# Patient Record
Sex: MALE | Race: WHITE | NOT HISPANIC OR LATINO
[De-identification: names, ages, dates, MRNs, and addresses within clinical notes are randomized per-mention and may not be internally consistent; named-entity substitution may affect disease eponyms.]

---

## 2018-03-01 ENCOUNTER — APPOINTMENT (OUTPATIENT)
Dept: CARDIOLOGY | Facility: CLINIC | Age: 53
End: 2018-03-01
Payer: MEDICARE

## 2018-03-01 ENCOUNTER — NON-APPOINTMENT (OUTPATIENT)
Age: 53
End: 2018-03-01

## 2018-03-01 VITALS
DIASTOLIC BLOOD PRESSURE: 94 MMHG | HEART RATE: 88 BPM | OXYGEN SATURATION: 94 % | SYSTOLIC BLOOD PRESSURE: 162 MMHG | HEIGHT: 70 IN | WEIGHT: 242 LBS | RESPIRATION RATE: 17 BRPM | BODY MASS INDEX: 34.65 KG/M2

## 2018-03-01 VITALS — SYSTOLIC BLOOD PRESSURE: 170 MMHG | DIASTOLIC BLOOD PRESSURE: 95 MMHG

## 2018-03-01 DIAGNOSIS — R94.31 ABNORMAL ELECTROCARDIOGRAM [ECG] [EKG]: ICD-10-CM

## 2018-03-01 DIAGNOSIS — Z87.891 PERSONAL HISTORY OF NICOTINE DEPENDENCE: ICD-10-CM

## 2018-03-01 DIAGNOSIS — Z82.49 FAMILY HISTORY OF ISCHEMIC HEART DISEASE AND OTHER DISEASES OF THE CIRCULATORY SYSTEM: ICD-10-CM

## 2018-03-01 DIAGNOSIS — Z87.898 PERSONAL HISTORY OF OTHER SPECIFIED CONDITIONS: ICD-10-CM

## 2018-03-01 PROCEDURE — 93000 ELECTROCARDIOGRAM COMPLETE: CPT

## 2018-03-01 PROCEDURE — 99244 OFF/OP CNSLTJ NEW/EST MOD 40: CPT

## 2018-03-12 LAB
ALBUMIN SERPL ELPH-MCNC: 4.3 G/DL
ALP BLD-CCNC: 65 U/L
ALT SERPL-CCNC: 45 U/L
ANION GAP SERPL CALC-SCNC: 14 MMOL/L
AST SERPL-CCNC: 33 U/L
BILIRUB SERPL-MCNC: 0.9 MG/DL
BUN SERPL-MCNC: 20 MG/DL
CALCIUM SERPL-MCNC: 9.4 MG/DL
CHLORIDE SERPL-SCNC: 99 MMOL/L
CHOLEST SERPL-MCNC: 146 MG/DL
CHOLEST/HDLC SERPL: 5.2 RATIO
CO2 SERPL-SCNC: 24 MMOL/L
CREAT SERPL-MCNC: 0.76 MG/DL
HDLC SERPL-MCNC: 28 MG/DL
LDLC SERPL CALC-MCNC: 96 MG/DL
POTASSIUM SERPL-SCNC: 4 MMOL/L
PROT SERPL-MCNC: 7.2 G/DL
SODIUM SERPL-SCNC: 137 MMOL/L
TRIGL SERPL-MCNC: 109 MG/DL

## 2018-05-02 ENCOUNTER — APPOINTMENT (OUTPATIENT)
Dept: CARDIOLOGY | Facility: CLINIC | Age: 53
End: 2018-05-02
Payer: MEDICARE

## 2018-05-02 PROCEDURE — 93306 TTE W/DOPPLER COMPLETE: CPT

## 2018-05-02 PROCEDURE — 93015 CV STRESS TEST SUPVJ I&R: CPT

## 2018-05-17 ENCOUNTER — APPOINTMENT (OUTPATIENT)
Dept: CARDIOLOGY | Facility: CLINIC | Age: 53
End: 2018-05-17
Payer: MEDICARE

## 2018-05-17 PROCEDURE — A9500: CPT

## 2018-05-17 PROCEDURE — 78452 HT MUSCLE IMAGE SPECT MULT: CPT

## 2018-05-17 PROCEDURE — 93015 CV STRESS TEST SUPVJ I&R: CPT

## 2018-05-30 ENCOUNTER — RX RENEWAL (OUTPATIENT)
Age: 53
End: 2018-05-30

## 2018-06-25 ENCOUNTER — RX RENEWAL (OUTPATIENT)
Age: 53
End: 2018-06-25

## 2018-07-18 ENCOUNTER — RX RENEWAL (OUTPATIENT)
Age: 53
End: 2018-07-18

## 2018-08-09 ENCOUNTER — RX RENEWAL (OUTPATIENT)
Age: 53
End: 2018-08-09

## 2018-08-30 ENCOUNTER — RX RENEWAL (OUTPATIENT)
Age: 53
End: 2018-08-30

## 2018-11-13 ENCOUNTER — APPOINTMENT (OUTPATIENT)
Dept: INTERNAL MEDICINE | Facility: CLINIC | Age: 53
End: 2018-11-13
Payer: MEDICARE

## 2018-11-13 VITALS
HEART RATE: 88 BPM | RESPIRATION RATE: 16 BRPM | BODY MASS INDEX: 33.64 KG/M2 | HEIGHT: 70 IN | DIASTOLIC BLOOD PRESSURE: 80 MMHG | WEIGHT: 235 LBS | SYSTOLIC BLOOD PRESSURE: 120 MMHG

## 2018-11-13 DIAGNOSIS — Z86.39 PERSONAL HISTORY OF OTHER ENDOCRINE, NUTRITIONAL AND METABOLIC DISEASE: ICD-10-CM

## 2018-11-13 DIAGNOSIS — B36.9 SUPERFICIAL MYCOSIS, UNSPECIFIED: ICD-10-CM

## 2018-11-13 PROCEDURE — 99204 OFFICE O/P NEW MOD 45 MIN: CPT | Mod: 25

## 2018-11-13 NOTE — HISTORY OF PRESENT ILLNESS
[FreeTextEntry1] : New patient visit [de-identified] : Had cardiac workup this year normal workup\par \par hx HTN and high TG

## 2019-01-22 ENCOUNTER — RX RENEWAL (OUTPATIENT)
Age: 54
End: 2019-01-22

## 2019-02-11 ENCOUNTER — NON-APPOINTMENT (OUTPATIENT)
Age: 54
End: 2019-02-11

## 2019-02-11 ENCOUNTER — APPOINTMENT (OUTPATIENT)
Dept: CARDIOLOGY | Facility: CLINIC | Age: 54
End: 2019-02-11
Payer: MEDICARE

## 2019-02-11 VITALS
BODY MASS INDEX: 34.93 KG/M2 | SYSTOLIC BLOOD PRESSURE: 177 MMHG | HEIGHT: 70 IN | WEIGHT: 244 LBS | OXYGEN SATURATION: 93 % | HEART RATE: 55 BPM | DIASTOLIC BLOOD PRESSURE: 95 MMHG | RESPIRATION RATE: 17 BRPM

## 2019-02-11 VITALS — DIASTOLIC BLOOD PRESSURE: 80 MMHG | SYSTOLIC BLOOD PRESSURE: 160 MMHG

## 2019-02-11 DIAGNOSIS — I77.810 THORACIC AORTIC ECTASIA: ICD-10-CM

## 2019-02-11 PROCEDURE — 99215 OFFICE O/P EST HI 40 MIN: CPT

## 2019-02-11 PROCEDURE — 93000 ELECTROCARDIOGRAM COMPLETE: CPT

## 2019-02-11 NOTE — DISCUSSION/SUMMARY
[Hyperlipidemia] : hyperlipidemia [Lipids Test Panel] : a fasting lipid profile [Stress Test Treadmill] : an exercise treadmill test [Smoking] : smoking [Family History of CAD] : family history of CAD [Hypertension] : hypertension [Not Responding to Treatment] : not responding to treatment [Echocardiogram] : echocardiogram [Stress Testing] : stress testing [Medication Changes Per Orders] : as documented in orders [Exercise Regimen] : an exercise regimen [Weight Loss] : weight loss [Sodium Restriction] : sodium restriction [de-identified] : increase valsarten to 320/12.5

## 2019-02-11 NOTE — PHYSICAL EXAM
[General Appearance - Well Developed] : well developed [Normal Appearance] : normal appearance [Well Groomed] : well groomed [General Appearance - Well Nourished] : well nourished [No Deformities] : no deformities [General Appearance - In No Acute Distress] : no acute distress [Normal Conjunctiva] : the conjunctiva exhibited no abnormalities [Eyelids - No Xanthelasma] : the eyelids demonstrated no xanthelasmas [Normal Oral Mucosa] : normal oral mucosa [No Oral Pallor] : no oral pallor [No Oral Cyanosis] : no oral cyanosis [Normal Jugular Venous A Waves Present] : normal jugular venous A waves present [Normal Jugular Venous V Waves Present] : normal jugular venous V waves present [No Jugular Venous Salas A Waves] : no jugular venous salas A waves [Respiration, Rhythm And Depth] : normal respiratory rhythm and effort [Exaggerated Use Of Accessory Muscles For Inspiration] : no accessory muscle use [Auscultation Breath Sounds / Voice Sounds] : lungs were clear to auscultation bilaterally [Abdomen Soft] : soft [Abdomen Tenderness] : non-tender [Abdomen Mass (___ Cm)] : no abdominal mass palpated [Abnormal Walk] : normal gait [Gait - Sufficient For Exercise Testing] : the gait was sufficient for exercise testing [Nail Clubbing] : no clubbing of the fingernails [Cyanosis, Localized] : no localized cyanosis [Petechial Hemorrhages (___cm)] : no petechial hemorrhages [Skin Color & Pigmentation] : normal skin color and pigmentation [] : no rash [No Venous Stasis] : no venous stasis [Skin Lesions] : no skin lesions [No Skin Ulcers] : no skin ulcer [No Xanthoma] : no  xanthoma was observed [Oriented To Time, Place, And Person] : oriented to person, place, and time [Affect] : the affect was normal [Mood] : the mood was normal [No Anxiety] : not feeling anxious [Normal Rate] : normal [Normal S1] : normal S1 [Normal S2] : normal S2 [S3] : no S3 [S4] : no S4 [No Murmur] : no murmurs heard [Right Carotid Bruit] : no bruit heard over the right carotid [Left Carotid Bruit] : no bruit heard over the left carotid [Right Femoral Bruit] : no bruit heard over the right femoral artery [Left Femoral Bruit] : no bruit heard over the left femoral artery [2+] : left 2+ [No Abnormalities] : the abdominal aorta was not enlarged and no bruit was heard [No Pitting Edema] : no pitting edema present

## 2019-02-11 NOTE — REASON FOR VISIT
[Consultation] : a consultation regarding [Hyperlipidemia] : hyperlipidemia [Hypertension] : hypertension [FreeTextEntry2] : pt changing cardiologists with multiple risk factors, denies sscp or sob, remote h/o syncope 2014 with hi BP, no dizziness or syncope since

## 2019-03-20 ENCOUNTER — RX RENEWAL (OUTPATIENT)
Age: 54
End: 2019-03-20

## 2019-05-22 ENCOUNTER — RX RENEWAL (OUTPATIENT)
Age: 54
End: 2019-05-22

## 2019-06-03 ENCOUNTER — APPOINTMENT (OUTPATIENT)
Dept: CARDIOLOGY | Facility: CLINIC | Age: 54
End: 2019-06-03
Payer: MEDICARE

## 2019-06-03 ENCOUNTER — NON-APPOINTMENT (OUTPATIENT)
Age: 54
End: 2019-06-03

## 2019-06-03 VITALS
SYSTOLIC BLOOD PRESSURE: 177 MMHG | BODY MASS INDEX: 35.07 KG/M2 | RESPIRATION RATE: 17 BRPM | DIASTOLIC BLOOD PRESSURE: 74 MMHG | OXYGEN SATURATION: 94 % | HEIGHT: 70 IN | HEART RATE: 76 BPM | WEIGHT: 245 LBS

## 2019-06-03 VITALS — DIASTOLIC BLOOD PRESSURE: 84 MMHG | SYSTOLIC BLOOD PRESSURE: 148 MMHG

## 2019-06-03 PROCEDURE — 99214 OFFICE O/P EST MOD 30 MIN: CPT

## 2019-06-03 PROCEDURE — 93306 TTE W/DOPPLER COMPLETE: CPT

## 2019-06-03 PROCEDURE — 93000 ELECTROCARDIOGRAM COMPLETE: CPT

## 2019-06-05 NOTE — REASON FOR VISIT
[Hyperlipidemia] : hyperlipidemia [Hypertension] : hypertension [Follow-Up - Clinic] : a clinic follow-up of [FreeTextEntry2] : pt changing cardiologists with multiple risk factors, denies sscp or sob, remote h/o syncope 2014 with hi BP, no dizziness or syncope since

## 2019-06-05 NOTE — DISCUSSION/SUMMARY
[Hyperlipidemia] : hyperlipidemia [Lipids Test Panel] : a fasting lipid profile [Smoking] : smoking [Hypertension] : hypertension [Family History of CAD] : family history of CAD [Exercise Regimen] : an exercise regimen [Medication Changes Per Orders] : as documented in orders [Sodium Restriction] : sodium restriction [Weight Loss] : weight loss [Responding to Treatment] : responding to treatment [Electron Beam CT] : electron beam CT [de-identified] : dilated aorta

## 2019-06-05 NOTE — PHYSICAL EXAM
[General Appearance - Well Developed] : well developed [Normal Appearance] : normal appearance [Well Groomed] : well groomed [General Appearance - Well Nourished] : well nourished [No Deformities] : no deformities [General Appearance - In No Acute Distress] : no acute distress [Eyelids - No Xanthelasma] : the eyelids demonstrated no xanthelasmas [Normal Conjunctiva] : the conjunctiva exhibited no abnormalities [Normal Oral Mucosa] : normal oral mucosa [No Oral Pallor] : no oral pallor [No Oral Cyanosis] : no oral cyanosis [Normal Jugular Venous A Waves Present] : normal jugular venous A waves present [Normal Jugular Venous V Waves Present] : normal jugular venous V waves present [No Jugular Venous Salas A Waves] : no jugular venous salas A waves [Exaggerated Use Of Accessory Muscles For Inspiration] : no accessory muscle use [Respiration, Rhythm And Depth] : normal respiratory rhythm and effort [Auscultation Breath Sounds / Voice Sounds] : lungs were clear to auscultation bilaterally [Abdomen Soft] : soft [Abdomen Tenderness] : non-tender [Abdomen Mass (___ Cm)] : no abdominal mass palpated [Abnormal Walk] : normal gait [Gait - Sufficient For Exercise Testing] : the gait was sufficient for exercise testing [Nail Clubbing] : no clubbing of the fingernails [Cyanosis, Localized] : no localized cyanosis [Petechial Hemorrhages (___cm)] : no petechial hemorrhages [Skin Color & Pigmentation] : normal skin color and pigmentation [] : no rash [No Venous Stasis] : no venous stasis [Skin Lesions] : no skin lesions [No Skin Ulcers] : no skin ulcer [No Xanthoma] : no  xanthoma was observed [Oriented To Time, Place, And Person] : oriented to person, place, and time [Mood] : the mood was normal [No Anxiety] : not feeling anxious [Affect] : the affect was normal [Normal S1] : normal S1 [Normal Rate] : normal [Normal S2] : normal S2 [No Murmur] : no murmurs heard [No Abnormalities] : the abdominal aorta was not enlarged and no bruit was heard [2+] : left 2+ [No Pitting Edema] : no pitting edema present [S3] : no S3 [S4] : no S4 [Right Carotid Bruit] : no bruit heard over the right carotid [Left Carotid Bruit] : no bruit heard over the left carotid [Left Femoral Bruit] : no bruit heard over the left femoral artery [Right Femoral Bruit] : no bruit heard over the right femoral artery

## 2019-06-12 LAB
ALBUMIN SERPL ELPH-MCNC: 4.3 G/DL
ALP BLD-CCNC: 63 U/L
ALT SERPL-CCNC: 61 U/L
ANION GAP SERPL CALC-SCNC: 11 MMOL/L
AST SERPL-CCNC: 42 U/L
BILIRUB SERPL-MCNC: 0.7 MG/DL
BUN SERPL-MCNC: 21 MG/DL
CALCIUM SERPL-MCNC: 9.2 MG/DL
CHLORIDE SERPL-SCNC: 100 MMOL/L
CHOLEST SERPL-MCNC: 138 MG/DL
CHOLEST/HDLC SERPL: 4.8 RATIO
CO2 SERPL-SCNC: 31 MMOL/L
CREAT SERPL-MCNC: 0.88 MG/DL
GLUCOSE SERPL-MCNC: 117 MG/DL
HDLC SERPL-MCNC: 29 MG/DL
LDLC SERPL CALC-MCNC: 64 MG/DL
POTASSIUM SERPL-SCNC: 4.7 MMOL/L
PROT SERPL-MCNC: 6.8 G/DL
SODIUM SERPL-SCNC: 142 MMOL/L
TRIGL SERPL-MCNC: 225 MG/DL

## 2019-06-26 ENCOUNTER — OUTPATIENT (OUTPATIENT)
Dept: OUTPATIENT SERVICES | Facility: HOSPITAL | Age: 54
LOS: 1 days | End: 2019-06-26
Payer: MEDICARE

## 2019-06-26 ENCOUNTER — APPOINTMENT (OUTPATIENT)
Dept: CT IMAGING | Facility: HOSPITAL | Age: 54
End: 2019-06-26

## 2019-06-26 DIAGNOSIS — R07.9 CHEST PAIN, UNSPECIFIED: ICD-10-CM

## 2019-06-26 PROCEDURE — 71275 CT ANGIOGRAPHY CHEST: CPT

## 2019-06-26 PROCEDURE — 71275 CT ANGIOGRAPHY CHEST: CPT | Mod: 26

## 2019-09-19 ENCOUNTER — APPOINTMENT (OUTPATIENT)
Dept: CARDIOLOGY | Facility: CLINIC | Age: 54
End: 2019-09-19
Payer: MEDICARE

## 2019-09-30 ENCOUNTER — NON-APPOINTMENT (OUTPATIENT)
Age: 54
End: 2019-09-30

## 2019-09-30 PROCEDURE — 93224 XTRNL ECG REC UP TO 48 HRS: CPT

## 2019-10-03 ENCOUNTER — APPOINTMENT (OUTPATIENT)
Dept: CARDIOLOGY | Facility: CLINIC | Age: 54
End: 2019-10-03
Payer: MEDICARE

## 2019-10-03 VITALS
DIASTOLIC BLOOD PRESSURE: 100 MMHG | WEIGHT: 250 LBS | OXYGEN SATURATION: 98 % | RESPIRATION RATE: 17 BRPM | SYSTOLIC BLOOD PRESSURE: 180 MMHG | BODY MASS INDEX: 35.79 KG/M2 | HEIGHT: 70 IN | HEART RATE: 86 BPM

## 2019-10-03 PROCEDURE — 93000 ELECTROCARDIOGRAM COMPLETE: CPT

## 2019-10-03 PROCEDURE — 99215 OFFICE O/P EST HI 40 MIN: CPT

## 2019-10-03 NOTE — PHYSICAL EXAM
[General Appearance - Well Developed] : well developed [Normal Appearance] : normal appearance [Well Groomed] : well groomed [General Appearance - Well Nourished] : well nourished [No Deformities] : no deformities [General Appearance - In No Acute Distress] : no acute distress [Normal Conjunctiva] : the conjunctiva exhibited no abnormalities [Eyelids - No Xanthelasma] : the eyelids demonstrated no xanthelasmas [Normal Oral Mucosa] : normal oral mucosa [No Oral Pallor] : no oral pallor [Normal Jugular Venous A Waves Present] : normal jugular venous A waves present [No Oral Cyanosis] : no oral cyanosis [Normal Jugular Venous V Waves Present] : normal jugular venous V waves present [No Jugular Venous Salas A Waves] : no jugular venous salas A waves [Respiration, Rhythm And Depth] : normal respiratory rhythm and effort [Exaggerated Use Of Accessory Muscles For Inspiration] : no accessory muscle use [Auscultation Breath Sounds / Voice Sounds] : lungs were clear to auscultation bilaterally [Abdomen Soft] : soft [Abdomen Tenderness] : non-tender [Abdomen Mass (___ Cm)] : no abdominal mass palpated [Abnormal Walk] : normal gait [Gait - Sufficient For Exercise Testing] : the gait was sufficient for exercise testing [Nail Clubbing] : no clubbing of the fingernails [Petechial Hemorrhages (___cm)] : no petechial hemorrhages [Cyanosis, Localized] : no localized cyanosis [Skin Color & Pigmentation] : normal skin color and pigmentation [] : no rash [No Venous Stasis] : no venous stasis [No Skin Ulcers] : no skin ulcer [Skin Lesions] : no skin lesions [No Xanthoma] : no  xanthoma was observed [Oriented To Time, Place, And Person] : oriented to person, place, and time [Affect] : the affect was normal [Mood] : the mood was normal [No Anxiety] : not feeling anxious [Normal Rate] : normal [Normal S1] : normal S1 [Normal S2] : normal S2 [No Murmur] : no murmurs heard [2+] : left 2+ [No Abnormalities] : the abdominal aorta was not enlarged and no bruit was heard [No Pitting Edema] : no pitting edema present [S3] : no S3 [S4] : no S4 [Right Carotid Bruit] : no bruit heard over the right carotid [Left Carotid Bruit] : no bruit heard over the left carotid [Right Femoral Bruit] : no bruit heard over the right femoral artery [Left Femoral Bruit] : no bruit heard over the left femoral artery

## 2019-10-03 NOTE — REASON FOR VISIT
[Follow-Up - Clinic] : a clinic follow-up of [Hyperlipidemia] : hyperlipidemia [Hypertension] : hypertension [FreeTextEntry2] : pt s/p changed cardiologists with multiple risk factors, denies sscp or sob, remote h/o syncope 2014 with hi BP, no dizziness or syncope since

## 2019-10-03 NOTE — DISCUSSION/SUMMARY
[Hyperlipidemia] : hyperlipidemia [Lipids Test Panel] : a fasting lipid profile [Smoking] : smoking [Family History of CAD] : family history of CAD [Hypertension] : hypertension [Exercise Regimen] : an exercise regimen [Weight Loss] : weight loss [Sodium Restriction] : sodium restriction [PVCs] : ectopic ventricular beats [Not Responding to Treatment] : not responding to treatment [Medication Changes Per Orders] : as documented in orders [de-identified] : kailash lane with dr lacey [de-identified] : add norvasc 5 mg

## 2019-10-14 RX ORDER — AMLODIPINE BESYLATE 5 MG/1
5 TABLET ORAL DAILY
Qty: 30 | Refills: 3 | Status: DISCONTINUED | COMMUNITY
Start: 2019-10-03 | End: 2019-10-14

## 2019-10-22 ENCOUNTER — APPOINTMENT (OUTPATIENT)
Dept: CARDIOLOGY | Facility: CLINIC | Age: 54
End: 2019-10-22
Payer: MEDICARE

## 2019-10-22 PROCEDURE — 93975 VASCULAR STUDY: CPT

## 2019-12-11 ENCOUNTER — APPOINTMENT (OUTPATIENT)
Dept: CARDIOLOGY | Facility: CLINIC | Age: 54
End: 2019-12-11

## 2020-01-14 ENCOUNTER — RX RENEWAL (OUTPATIENT)
Age: 55
End: 2020-01-14

## 2020-02-03 ENCOUNTER — APPOINTMENT (OUTPATIENT)
Dept: INTERNAL MEDICINE | Facility: CLINIC | Age: 55
End: 2020-02-03
Payer: MEDICARE

## 2020-02-03 VITALS
SYSTOLIC BLOOD PRESSURE: 150 MMHG | WEIGHT: 238 LBS | BODY MASS INDEX: 34.07 KG/M2 | HEART RATE: 86 BPM | OXYGEN SATURATION: 96 % | RESPIRATION RATE: 17 BRPM | DIASTOLIC BLOOD PRESSURE: 80 MMHG | TEMPERATURE: 97.9 F | HEIGHT: 70 IN

## 2020-02-03 DIAGNOSIS — R21 RASH AND OTHER NONSPECIFIC SKIN ERUPTION: ICD-10-CM

## 2020-02-03 DIAGNOSIS — Z23 ENCOUNTER FOR IMMUNIZATION: ICD-10-CM

## 2020-02-03 DIAGNOSIS — M25.561 PAIN IN RIGHT KNEE: ICD-10-CM

## 2020-02-03 DIAGNOSIS — B35.1 TINEA UNGUIUM: ICD-10-CM

## 2020-02-03 DIAGNOSIS — Z00.00 ENCOUNTER FOR GENERAL ADULT MEDICAL EXAMINATION W/OUT ABNORMAL FINDINGS: ICD-10-CM

## 2020-02-03 PROCEDURE — G0439: CPT

## 2020-02-03 PROCEDURE — 90686 IIV4 VACC NO PRSV 0.5 ML IM: CPT

## 2020-02-03 PROCEDURE — G0008: CPT

## 2020-02-03 PROCEDURE — G0444 DEPRESSION SCREEN ANNUAL: CPT | Mod: 59

## 2020-02-03 NOTE — HISTORY OF PRESENT ILLNESS
How Severe Is Your Skin Lesion?: mild Has Your Skin Lesion Been Treated?: not been treated Is This A New Presentation, Or A Follow-Up?: Skin Lesion [FreeTextEntry1] : Here for physical [de-identified] : Right knee\par -pain, "blows up" often\par -weekly\par \par rash\par -back rash, itchy\par -2 mos ago\par -on and off\par Toenail fungus\par Cough chronic\par -non-productive\par -smokers cigars daily

## 2020-02-03 NOTE — HEALTH RISK ASSESSMENT
[Good] : ~his/her~ current health as good [] : Yes [Yes] : Yes [2 - 3 times a week (3 pts)] : 2 - 3  times a week (3 points) [Never (0 pts)] : Never (0 points) [1 or 2 (0 pts)] : 1 or 2 (0 points) [No falls in past year] : Patient reported no falls in the past year [No] : In the past 12 months have you used drugs other than those required for medical reasons? No [0] : 2) Feeling down, depressed, or hopeless: Not at all (0) [Patient reported colonoscopy was normal] : Patient reported colonoscopy was normal [HIV test declined] : HIV test declined [Hepatitis C test declined] : Hepatitis C test declined [Employed] : employed [High School] : high school [With Family] : lives with family [Single] : single [Feels Safe at Home] : Feels safe at home [Fully functional (bathing, dressing, toileting, transferring, walking, feeding)] : Fully functional (bathing, dressing, toileting, transferring, walking, feeding) [Fully functional (using the telephone, shopping, preparing meals, housekeeping, doing laundry, using] : Fully functional and needs no help or supervision to perform IADLs (using the telephone, shopping, preparing meals, housekeeping, doing laundry, using transportation, managing medications and managing finances) [FreeTextEntry1] : above [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 [de-identified] : [employer] [Audit-CScore] : 0 [LTE1Vsdoc] : 0 [de-identified] : fair [Change in mental status noted] : No change in mental status noted [Behavior] : denies difficulty with behavior [Language] : denies difficulty with language [Reasoning] : denies difficulty with reasoning [Learning/Retaining New Information] : denies difficulty learning/retaining new information [Sexually Active] : not sexually active [Reports changes in hearing] : Reports no changes in hearing [Spatial Ability and Orientation] : denies difficulty with spatial ability and orientation [High Risk Behavior] : no high risk behavior [Reports normal functional visual acuity (ie: able to read med bottle)] : Reports poor functional visual acuity.  [Reports changes in vision] : Reports no changes in vision [Reports changes in dental health] : Reports no changes in dental health [ColonoscopyDate] : 2015 [Smoke Detector] : no smoke detector

## 2020-02-03 NOTE — PHYSICAL EXAM
[No Acute Distress] : no acute distress [Well Nourished] : well nourished [Well Developed] : well developed [Normal Sclera/Conjunctiva] : normal sclera/conjunctiva [Well-Appearing] : well-appearing [PERRL] : pupils equal round and reactive to light [EOMI] : extraocular movements intact [Normal Outer Ear/Nose] : the outer ears and nose were normal in appearance [Normal Oropharynx] : the oropharynx was normal [No JVD] : no jugular venous distention [No Lymphadenopathy] : no lymphadenopathy [Supple] : supple [Thyroid Normal, No Nodules] : the thyroid was normal and there were no nodules present [No Respiratory Distress] : no respiratory distress  [Clear to Auscultation] : lungs were clear to auscultation bilaterally [No Accessory Muscle Use] : no accessory muscle use [Normal Rate] : normal rate  [Regular Rhythm] : with a regular rhythm [Normal S1, S2] : normal S1 and S2 [No Murmur] : no murmur heard [No Carotid Bruits] : no carotid bruits [No Abdominal Bruit] : a ~M bruit was not heard ~T in the abdomen [No Edema] : there was no peripheral edema [Pedal Pulses Present] : the pedal pulses are present [No Varicosities] : no varicosities [No Palpable Aorta] : no palpable aorta [Soft] : abdomen soft [No Extremity Clubbing/Cyanosis] : no extremity clubbing/cyanosis [Non Tender] : non-tender [Non-distended] : non-distended [No Masses] : no abdominal mass palpated [No HSM] : no HSM [Normal Bowel Sounds] : normal bowel sounds [Normal Posterior Cervical Nodes] : no posterior cervical lymphadenopathy [Normal Anterior Cervical Nodes] : no anterior cervical lymphadenopathy [No CVA Tenderness] : no CVA  tenderness [No Spinal Tenderness] : no spinal tenderness [No Joint Swelling] : no joint swelling [Grossly Normal Strength/Tone] : grossly normal strength/tone [No Rash] : no rash [No Focal Deficits] : no focal deficits [Coordination Grossly Intact] : coordination grossly intact [Normal Gait] : normal gait [Deep Tendon Reflexes (DTR)] : deep tendon reflexes were 2+ and symmetric [Normal Affect] : the affect was normal [Normal Insight/Judgement] : insight and judgment were intact

## 2020-02-06 ENCOUNTER — APPOINTMENT (OUTPATIENT)
Dept: CARDIOLOGY | Facility: CLINIC | Age: 55
End: 2020-02-06

## 2020-02-07 LAB
25(OH)D3 SERPL-MCNC: 15.2 NG/ML
ALBUMIN SERPL ELPH-MCNC: 4.4 G/DL
ALP BLD-CCNC: 76 U/L
ALT SERPL-CCNC: 55 U/L
ANION GAP SERPL CALC-SCNC: 12 MMOL/L
APPEARANCE: CLEAR
AST SERPL-CCNC: 32 U/L
BACTERIA: NEGATIVE
BASOPHILS # BLD AUTO: 0.02 K/UL
BASOPHILS NFR BLD AUTO: 0.4 %
BILIRUB SERPL-MCNC: 0.7 MG/DL
BILIRUBIN URINE: NEGATIVE
BLOOD URINE: NORMAL
BUN SERPL-MCNC: 17 MG/DL
CALCIUM SERPL-MCNC: 9.2 MG/DL
CHLORIDE SERPL-SCNC: 100 MMOL/L
CHOLEST SERPL-MCNC: 156 MG/DL
CHOLEST/HDLC SERPL: 3.9 RATIO
CO2 SERPL-SCNC: 27 MMOL/L
COLOR: YELLOW
CREAT SERPL-MCNC: 0.77 MG/DL
EOSINOPHIL # BLD AUTO: 0.13 K/UL
EOSINOPHIL NFR BLD AUTO: 2.6 %
ESTIMATED AVERAGE GLUCOSE: 100 MG/DL
GLUCOSE QUALITATIVE U: NEGATIVE
GLUCOSE SERPL-MCNC: 132 MG/DL
HBA1C MFR BLD HPLC: 5.1 %
HCT VFR BLD CALC: 47.6 %
HDLC SERPL-MCNC: 40 MG/DL
HGB BLD-MCNC: 16.5 G/DL
HYALINE CASTS: 0 /LPF
IMM GRANULOCYTES NFR BLD AUTO: 0.4 %
KETONES URINE: NEGATIVE
LDLC SERPL CALC-MCNC: 97 MG/DL
LEUKOCYTE ESTERASE URINE: NEGATIVE
LYMPHOCYTES # BLD AUTO: 0.9 K/UL
LYMPHOCYTES NFR BLD AUTO: 18.2 %
MAN DIFF?: NORMAL
MCHC RBC-ENTMCNC: 33.7 PG
MCHC RBC-ENTMCNC: 34.7 GM/DL
MCV RBC AUTO: 97.3 FL
MICROSCOPIC-UA: NORMAL
MONOCYTES # BLD AUTO: 0.39 K/UL
MONOCYTES NFR BLD AUTO: 7.9 %
NEUTROPHILS # BLD AUTO: 3.49 K/UL
NEUTROPHILS NFR BLD AUTO: 70.5 %
NITRITE URINE: NEGATIVE
PH URINE: 6.5
PLATELET # BLD AUTO: 147 K/UL
POTASSIUM SERPL-SCNC: 4.6 MMOL/L
PROT SERPL-MCNC: 6.7 G/DL
PROTEIN URINE: ABNORMAL
PSA SERPL-MCNC: 0.81 NG/ML
RBC # BLD: 4.89 M/UL
RBC # FLD: 13.3 %
RED BLOOD CELLS URINE: 5 /HPF
SODIUM SERPL-SCNC: 139 MMOL/L
SPECIFIC GRAVITY URINE: 1.02
SQUAMOUS EPITHELIAL CELLS: 0 /HPF
TRIGL SERPL-MCNC: 97 MG/DL
TSH SERPL-ACNC: 0.85 UIU/ML
UROBILINOGEN URINE: NORMAL
WBC # FLD AUTO: 4.95 K/UL
WHITE BLOOD CELLS URINE: 1 /HPF

## 2020-02-12 ENCOUNTER — APPOINTMENT (OUTPATIENT)
Dept: CARDIOLOGY | Facility: CLINIC | Age: 55
End: 2020-02-12
Payer: MEDICARE

## 2020-02-12 ENCOUNTER — NON-APPOINTMENT (OUTPATIENT)
Age: 55
End: 2020-02-12

## 2020-02-12 VITALS
BODY MASS INDEX: 33.79 KG/M2 | HEART RATE: 70 BPM | SYSTOLIC BLOOD PRESSURE: 180 MMHG | OXYGEN SATURATION: 97 % | RESPIRATION RATE: 17 BRPM | WEIGHT: 236 LBS | DIASTOLIC BLOOD PRESSURE: 84 MMHG | HEIGHT: 70 IN

## 2020-02-12 PROCEDURE — 93000 ELECTROCARDIOGRAM COMPLETE: CPT

## 2020-02-12 PROCEDURE — 99213 OFFICE O/P EST LOW 20 MIN: CPT

## 2020-02-12 NOTE — HISTORY OF PRESENT ILLNESS
[FreeTextEntry1] : Referring Physician: \par \par Dear  *****:\par \par Mr. Carlyle Weller was seen in the [facility] today. For our records, please allow me to summarize the history and my findings.\par \par This pleasant 55 year old man has a cardiovascular history significant for HL and HTN. He underwent routine stress testing in May and was noted to have frequent PVCs in recovery. Holter monitor in 9/19 showed frequent PVCs at 17%. TTE (9/19) showed LVH with normal biventricular systolic function and an otherwise grossly normal heart. Mr. Weller denies any palpitations, shortness of breath, dizziness or syncope.

## 2020-02-12 NOTE — PHYSICAL EXAM
[General Appearance - Well Developed] : well developed [Normal Appearance] : normal appearance [General Appearance - Well Nourished] : well nourished [Well Groomed] : well groomed [No Deformities] : no deformities [General Appearance - In No Acute Distress] : no acute distress [Normal Conjunctiva] : the conjunctiva exhibited no abnormalities [Eyelids - No Xanthelasma] : the eyelids demonstrated no xanthelasmas [Normal Oral Mucosa] : normal oral mucosa [No Oral Pallor] : no oral pallor [No Oral Cyanosis] : no oral cyanosis [Normal Jugular Venous A Waves Present] : normal jugular venous A waves present [Normal Jugular Venous V Waves Present] : normal jugular venous V waves present [No Jugular Venous Salas A Waves] : no jugular venous salas A waves [Heart Rate And Rhythm] : heart rate and rhythm were normal [Heart Sounds] : normal S1 and S2 [Murmurs] : no murmurs present [Respiration, Rhythm And Depth] : normal respiratory rhythm and effort [Exaggerated Use Of Accessory Muscles For Inspiration] : no accessory muscle use [Auscultation Breath Sounds / Voice Sounds] : lungs were clear to auscultation bilaterally [Abdomen Soft] : soft [Abdomen Tenderness] : non-tender [Abdomen Mass (___ Cm)] : no abdominal mass palpated [Abnormal Walk] : normal gait [Gait - Sufficient For Exercise Testing] : the gait was sufficient for exercise testing [Nail Clubbing] : no clubbing of the fingernails [Cyanosis, Localized] : no localized cyanosis [Petechial Hemorrhages (___cm)] : no petechial hemorrhages [Skin Color & Pigmentation] : normal skin color and pigmentation [] : no rash [No Venous Stasis] : no venous stasis [Skin Lesions] : no skin lesions [No Skin Ulcers] : no skin ulcer [No Xanthoma] : no  xanthoma was observed [Oriented To Time, Place, And Person] : oriented to person, place, and time [Affect] : the affect was normal [Mood] : the mood was normal [No Anxiety] : not feeling anxious

## 2020-02-12 NOTE — DISCUSSION/SUMMARY
[FreeTextEntry1] : In summary, this is a 55 year old man with LVH and asymptomatic PVCs. 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 These PVCs do not typically cause issues with dyssynchrony and have little risk of progression to cardiomyopathy. His systolic BP today was 180 mmHg, which may also be driving his PVC burden. He will increase Toprol to 70mg daily and return in 3 months for repeat Holter. At this time in the absence of symptoms I recommended only conservative therapy.\par \par Mr. Weller appeared to understand the whole discussion and verbalized that all of his questions were answered to his satisfaction.\par \par Thank you for allowing me to be involved in the care of this pleasant man. Please feel free to contact me with any questions.\par \par \par \par Obed Mendoza MD\par  of Cardiology\par Electrophysiology Section\par [address] [place] [address], [address] [place] [address]\[place] [address]\par Office: [phone]\par Fax: [phone]\par

## 2020-02-13 ENCOUNTER — FORM ENCOUNTER (OUTPATIENT)
Age: 55
End: 2020-02-13

## 2020-02-14 ENCOUNTER — OUTPATIENT (OUTPATIENT)
Dept: OUTPATIENT SERVICES | Facility: HOSPITAL | Age: 55
LOS: 1 days | End: 2020-02-14
Payer: MEDICARE

## 2020-02-14 DIAGNOSIS — Z00.00 ENCOUNTER FOR GENERAL ADULT MEDICAL EXAMINATION WITHOUT ABNORMAL FINDINGS: ICD-10-CM

## 2020-02-14 PROCEDURE — 73562 X-RAY EXAM OF KNEE 3: CPT | Mod: 26,RT

## 2020-02-14 PROCEDURE — G0297: CPT

## 2020-02-14 PROCEDURE — 73562 X-RAY EXAM OF KNEE 3: CPT

## 2020-02-14 PROCEDURE — G0297: CPT | Mod: 26

## 2020-06-24 ENCOUNTER — APPOINTMENT (OUTPATIENT)
Dept: CARDIOLOGY | Facility: CLINIC | Age: 55
End: 2020-06-24
Payer: MEDICARE

## 2020-06-24 ENCOUNTER — NON-APPOINTMENT (OUTPATIENT)
Age: 55
End: 2020-06-24

## 2020-06-24 VITALS
HEART RATE: 66 BPM | DIASTOLIC BLOOD PRESSURE: 94 MMHG | RESPIRATION RATE: 17 BRPM | WEIGHT: 236 LBS | TEMPERATURE: 98 F | OXYGEN SATURATION: 96 % | BODY MASS INDEX: 33.79 KG/M2 | SYSTOLIC BLOOD PRESSURE: 157 MMHG | HEIGHT: 70 IN

## 2020-06-24 PROCEDURE — 93000 ELECTROCARDIOGRAM COMPLETE: CPT

## 2020-06-24 PROCEDURE — 99213 OFFICE O/P EST LOW 20 MIN: CPT

## 2020-06-24 NOTE — DISCUSSION/SUMMARY
[FreeTextEntry1] : In summary, this is a 55 year old man with LVH and asymptomatic PVCs. 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 These PVCs do not typically cause issues with dyssynchrony and have little risk of progression to cardiomyopathy. He will repeat Holter monitoring today for reassessment of burden.\par \par Mr. Weller appeared to understand the whole discussion and verbalized that all of his questions were answered to his satisfaction.\par \par Thank you for allowing me to be involved in the care of this pleasant man. Please feel free to contact me with any questions.\par \par \par \par Obed Mendoza MD\[facility]  of Cardiology\par Electrophysiology Section\[address], [address] [place] [address]\[facility] Office: [phone]\par Fax: [phone]\[facility]

## 2020-06-24 NOTE — PHYSICAL EXAM
[General Appearance - Well Developed] : well developed [Normal Appearance] : normal appearance [Well Groomed] : well groomed [General Appearance - Well Nourished] : well nourished [No Deformities] : no deformities [General Appearance - In No Acute Distress] : no acute distress [Normal Conjunctiva] : the conjunctiva exhibited no abnormalities [Eyelids - No Xanthelasma] : the eyelids demonstrated no xanthelasmas [Normal Oral Mucosa] : normal oral mucosa [No Oral Pallor] : no oral pallor [No Oral Cyanosis] : no oral cyanosis [Normal Jugular Venous A Waves Present] : normal jugular venous A waves present [Normal Jugular Venous V Waves Present] : normal jugular venous V waves present [No Jugular Venous Salas A Waves] : no jugular venous salas A waves [Respiration, Rhythm And Depth] : normal respiratory rhythm and effort [Auscultation Breath Sounds / Voice Sounds] : lungs were clear to auscultation bilaterally [Exaggerated Use Of Accessory Muscles For Inspiration] : no accessory muscle use [Heart Sounds] : normal S1 and S2 [Heart Rate And Rhythm] : heart rate and rhythm were normal [Murmurs] : no murmurs present [Abdomen Tenderness] : non-tender [Abdomen Soft] : soft [Abdomen Mass (___ Cm)] : no abdominal mass palpated [Gait - Sufficient For Exercise Testing] : the gait was sufficient for exercise testing [Abnormal Walk] : normal gait [Nail Clubbing] : no clubbing of the fingernails [Cyanosis, Localized] : no localized cyanosis [Petechial Hemorrhages (___cm)] : no petechial hemorrhages [Skin Color & Pigmentation] : normal skin color and pigmentation [] : no rash [No Venous Stasis] : no venous stasis [Skin Lesions] : no skin lesions [No Skin Ulcers] : no skin ulcer [No Xanthoma] : no  xanthoma was observed [Oriented To Time, Place, And Person] : oriented to person, place, and time [Affect] : the affect was normal [Mood] : the mood was normal [No Anxiety] : not feeling anxious

## 2020-06-24 NOTE — HISTORY OF PRESENT ILLNESS
[FreeTextEntry1] : Referring Physician: Uriah Bryson MD\par \par Dear Uriah:\par \par Mr. Carlyle Weller was seen in the [facility] today. For our records, please allow me to summarize the history and my findings.\par \par This pleasant 55 year old man has a cardiovascular history significant for HL and HTN. He underwent routine stress testing in May and was noted to have frequent PVCs in recovery. Holter monitor in 9/19 showed frequent PVCs at 17%. TTE (9/19) showed LVH with normal biventricular systolic function and an otherwise grossly normal heart. He noted no symptoms associated with the arrhythmia. Beta blockers were uptitrated. He returns today for repeat Holter monitoring. Mr. Weller denies any palpitations, shortness of breath, dizziness or syncope.

## 2020-06-29 ENCOUNTER — NON-APPOINTMENT (OUTPATIENT)
Age: 55
End: 2020-06-29

## 2020-07-22 PROCEDURE — 93224 XTRNL ECG REC UP TO 48 HRS: CPT

## 2020-10-20 ENCOUNTER — RX RENEWAL (OUTPATIENT)
Age: 55
End: 2020-10-20

## 2020-11-23 ENCOUNTER — RX RENEWAL (OUTPATIENT)
Age: 55
End: 2020-11-23

## 2020-12-16 ENCOUNTER — RX RENEWAL (OUTPATIENT)
Age: 55
End: 2020-12-16

## 2021-02-28 ENCOUNTER — RX RENEWAL (OUTPATIENT)
Age: 56
End: 2021-02-28

## 2021-05-14 ENCOUNTER — RX RENEWAL (OUTPATIENT)
Age: 56
End: 2021-05-14

## 2021-10-20 ENCOUNTER — RX RENEWAL (OUTPATIENT)
Age: 56
End: 2021-10-20

## 2022-01-17 ENCOUNTER — NON-APPOINTMENT (OUTPATIENT)
Age: 57
End: 2022-01-17

## 2022-01-18 ENCOUNTER — RX RENEWAL (OUTPATIENT)
Age: 57
End: 2022-01-18

## 2022-01-18 ENCOUNTER — APPOINTMENT (OUTPATIENT)
Dept: FAMILY MEDICINE | Facility: CLINIC | Age: 57
End: 2022-01-18
Payer: MEDICARE

## 2022-01-18 ENCOUNTER — RESULT REVIEW (OUTPATIENT)
Age: 57
End: 2022-01-18

## 2022-01-18 VITALS
WEIGHT: 252 LBS | SYSTOLIC BLOOD PRESSURE: 152 MMHG | TEMPERATURE: 97.9 F | OXYGEN SATURATION: 96 % | HEIGHT: 70 IN | DIASTOLIC BLOOD PRESSURE: 94 MMHG | RESPIRATION RATE: 14 BRPM | HEART RATE: 84 BPM | BODY MASS INDEX: 36.08 KG/M2

## 2022-01-18 DIAGNOSIS — M79.89 OTHER SPECIFIED SOFT TISSUE DISORDERS: ICD-10-CM

## 2022-01-18 DIAGNOSIS — R06.2 WHEEZING: ICD-10-CM

## 2022-01-18 DIAGNOSIS — M25.569 PAIN IN UNSPECIFIED KNEE: ICD-10-CM

## 2022-01-18 PROCEDURE — 99214 OFFICE O/P EST MOD 30 MIN: CPT

## 2022-01-18 RX ORDER — NYSTATIN AND TRIAMCINOLONE ACETONIDE 100000; 1 MG/G; MG/G
100000-0.1 CREAM TOPICAL 3 TIMES DAILY
Qty: 2 | Refills: 5 | Status: DISCONTINUED | COMMUNITY
Start: 2020-02-03 | End: 2022-01-18

## 2022-01-18 RX ORDER — VALSARTAN AND HYDROCHLOROTHIAZIDE 320; 12.5 MG/1; MG/1
320-12.5 TABLET, FILM COATED ORAL DAILY
Qty: 30 | Refills: 1 | Status: DISCONTINUED | COMMUNITY
Start: 2018-05-30 | End: 2022-01-18

## 2022-01-18 RX ORDER — GEMFIBROZIL 600 MG/1
600 TABLET, FILM COATED ORAL DAILY
Qty: 60 | Refills: 1 | Status: DISCONTINUED | COMMUNITY
Start: 2018-05-30 | End: 2022-01-18

## 2022-01-18 RX ORDER — NYSTATIN AND TRIAMCINOLONE ACETONIDE 100000; 1 MG/G; MG/G
100000-0.1 CREAM TOPICAL 3 TIMES DAILY
Qty: 1 | Refills: 5 | Status: DISCONTINUED | COMMUNITY
Start: 2018-11-13 | End: 2022-01-18

## 2022-01-18 RX ORDER — CLONIDINE HYDROCHLORIDE 0.1 MG/1
0.1 TABLET ORAL
Qty: 30 | Refills: 1 | Status: DISCONTINUED | COMMUNITY
Start: 2019-10-14 | End: 2022-01-18

## 2022-01-18 RX ORDER — CICLOPIROX 80 MG/ML
8 SOLUTION TOPICAL
Qty: 1 | Refills: 1 | Status: DISCONTINUED | COMMUNITY
Start: 2020-02-03 | End: 2022-01-18

## 2022-01-18 NOTE — PHYSICAL EXAM
[No Respiratory Distress] : no respiratory distress  [No Accessory Muscle Use] : no accessory muscle use [Normal] : normal rate, regular rhythm, normal S1 and S2 and no murmur heard [de-identified] : some wheezing noticed upper lung fields  [de-identified] : R leg with varicose veins as well as skin changes of chronic venous insufficiency, knee ROM intact, ankle ROM intact

## 2022-01-18 NOTE — HISTORY OF PRESENT ILLNESS
[FreeTextEntry8] : 55 yo male presenting today for R leg swelling/skin changes. Accompanied by sister. Sister states that leg swelling has progressively become worse and notices that patient is favoring left leg. Patient admits to some knee and ankle pain but denies any trauma. \par Patient's sister also stated that she noticed that the patient has more urinary urgency and at times she is unsure if he is SOB after climbing a flight of stairs.  \par

## 2022-01-18 NOTE — PLAN
[FreeTextEntry1] : #LE extremity swelling \par -likely venous insufficiency \par -will get US imaging \par -advised to use compression stockings, elevate legs \par \par #Knee pain/Ankle pain \par -Xray results from 2/2020 reviewed, will repeat \par -can use Tylenol PRN for pain for now \par \par #Wheezing \par -Chest X-ray ordered \par \par -patient sister reports that patient has increased ETOH intake and is unsure if he stopped previous medications on his own. Patient advised to cut down on ETOH intake and patient sister states that she will have patient continue to follow-up with cardiology \par

## 2022-01-18 NOTE — REVIEW OF SYSTEMS
[Negative] : Respiratory [Muscle Weakness] : no muscle weakness [FreeTextEntry9] : knee pain/swelling

## 2022-01-24 LAB
ALBUMIN SERPL ELPH-MCNC: 4.2 G/DL
ALP BLD-CCNC: 71 U/L
ALT SERPL-CCNC: 65 U/L
ANION GAP SERPL CALC-SCNC: 13 MMOL/L
AST SERPL-CCNC: 41 U/L
BILIRUB SERPL-MCNC: 0.6 MG/DL
BUN SERPL-MCNC: 15 MG/DL
CALCIUM SERPL-MCNC: 9.1 MG/DL
CHLORIDE SERPL-SCNC: 100 MMOL/L
CO2 SERPL-SCNC: 29 MMOL/L
CREAT SERPL-MCNC: 0.79 MG/DL
GLUCOSE SERPL-MCNC: 133 MG/DL
POTASSIUM SERPL-SCNC: 5 MMOL/L
PROT SERPL-MCNC: 6.7 G/DL
PSA FREE FLD-MCNC: 33 %
PSA FREE SERPL-MCNC: 0.25 NG/ML
PSA SERPL-MCNC: 0.75 NG/ML
SODIUM SERPL-SCNC: 141 MMOL/L

## 2022-01-25 ENCOUNTER — OUTPATIENT (OUTPATIENT)
Dept: OUTPATIENT SERVICES | Facility: HOSPITAL | Age: 57
LOS: 1 days | End: 2022-01-25
Payer: MEDICARE

## 2022-01-25 ENCOUNTER — APPOINTMENT (OUTPATIENT)
Dept: RADIOLOGY | Facility: CLINIC | Age: 57
End: 2022-01-25
Payer: MEDICARE

## 2022-01-25 ENCOUNTER — APPOINTMENT (OUTPATIENT)
Dept: ULTRASOUND IMAGING | Facility: CLINIC | Age: 57
End: 2022-01-25
Payer: MEDICARE

## 2022-01-25 DIAGNOSIS — M25.579 PAIN IN UNSPECIFIED ANKLE AND JOINTS OF UNSPECIFIED FOOT: ICD-10-CM

## 2022-01-25 DIAGNOSIS — M79.89 OTHER SPECIFIED SOFT TISSUE DISORDERS: ICD-10-CM

## 2022-01-25 DIAGNOSIS — R06.2 WHEEZING: ICD-10-CM

## 2022-01-25 DIAGNOSIS — M25.569 PAIN IN UNSPECIFIED KNEE: ICD-10-CM

## 2022-01-25 PROCEDURE — 93971 EXTREMITY STUDY: CPT | Mod: 26,RT

## 2022-01-25 PROCEDURE — 73610 X-RAY EXAM OF ANKLE: CPT | Mod: 26,RT

## 2022-01-25 PROCEDURE — 71046 X-RAY EXAM CHEST 2 VIEWS: CPT | Mod: 26

## 2022-01-25 PROCEDURE — 73562 X-RAY EXAM OF KNEE 3: CPT

## 2022-01-25 PROCEDURE — 93926 LOWER EXTREMITY STUDY: CPT | Mod: 26,RT

## 2022-01-25 PROCEDURE — 73562 X-RAY EXAM OF KNEE 3: CPT | Mod: 26,RT

## 2022-01-25 PROCEDURE — 71046 X-RAY EXAM CHEST 2 VIEWS: CPT

## 2022-01-25 PROCEDURE — 93971 EXTREMITY STUDY: CPT

## 2022-01-25 PROCEDURE — 73610 X-RAY EXAM OF ANKLE: CPT

## 2022-01-25 PROCEDURE — 93926 LOWER EXTREMITY STUDY: CPT

## 2022-05-02 ENCOUNTER — NON-APPOINTMENT (OUTPATIENT)
Age: 57
End: 2022-05-02

## 2022-05-23 ENCOUNTER — NON-APPOINTMENT (OUTPATIENT)
Age: 57
End: 2022-05-23

## 2022-05-23 ENCOUNTER — APPOINTMENT (OUTPATIENT)
Dept: CARDIOLOGY | Facility: CLINIC | Age: 57
End: 2022-05-23
Payer: MEDICARE

## 2022-05-23 VITALS
RESPIRATION RATE: 19 BRPM | WEIGHT: 244 LBS | HEART RATE: 80 BPM | SYSTOLIC BLOOD PRESSURE: 160 MMHG | OXYGEN SATURATION: 97 % | HEIGHT: 70 IN | BODY MASS INDEX: 34.93 KG/M2 | DIASTOLIC BLOOD PRESSURE: 82 MMHG | TEMPERATURE: 97.6 F

## 2022-05-23 PROCEDURE — 99215 OFFICE O/P EST HI 40 MIN: CPT

## 2022-05-23 PROCEDURE — 93000 ELECTROCARDIOGRAM COMPLETE: CPT

## 2022-05-23 NOTE — DISCUSSION/SUMMARY
[PVCs] : ectopic ventricular beats [Echocardiogram] : an echocardiogram [Holter Monitor] : a Holter monitor [Hyperlipidemia] : hyperlipidemia [Lipids Test Panel] : a fasting lipid profile [Smoking] : smoking [Family History of CAD] : family history of CAD [Hypertension] : hypertension [Not Responding to Treatment] : not responding to treatment [Medication Changes Per Orders] : Medication changes are as documented in orders [Exercise Regimen] : an exercise regimen [Weight Loss] : weight loss [Minutes Spent: ___] : for [unfilled] ~Uminutes [de-identified] : pt not compliant with BB dose [de-identified] : ep f/u with dr lacey [de-identified] : inc metoprolol to 50 bide [de-identified] : consider add aRB if still elevated on higher dose BB [FreeTextEntry2] : pts wife present, last seen 2019

## 2022-06-09 ENCOUNTER — APPOINTMENT (OUTPATIENT)
Dept: CARDIOLOGY | Facility: CLINIC | Age: 57
End: 2022-06-09
Payer: MEDICARE

## 2022-06-09 PROCEDURE — 93306 TTE W/DOPPLER COMPLETE: CPT

## 2022-06-13 DIAGNOSIS — R94.01 ABNORMAL ELECTROENCEPHALOGRAM [EEG]: ICD-10-CM

## 2022-06-13 DIAGNOSIS — R94.39 ABNORMAL RESULT OF OTHER CARDIOVASCULAR FUNCTION STUDY: ICD-10-CM

## 2022-06-13 DIAGNOSIS — R26.89 OTHER ABNORMALITIES OF GAIT AND MOBILITY: ICD-10-CM

## 2022-06-13 DIAGNOSIS — M25.579 PAIN IN UNSPECIFIED ANKLE AND JOINTS OF UNSPECIFIED FOOT: ICD-10-CM

## 2022-06-13 DIAGNOSIS — R53.83 OTHER FATIGUE: ICD-10-CM

## 2022-06-17 ENCOUNTER — NON-APPOINTMENT (OUTPATIENT)
Age: 57
End: 2022-06-17

## 2022-06-17 PROCEDURE — 93224 XTRNL ECG REC UP TO 48 HRS: CPT

## 2022-06-30 ENCOUNTER — APPOINTMENT (OUTPATIENT)
Dept: CARDIOLOGY | Facility: CLINIC | Age: 57
End: 2022-06-30

## 2022-06-30 PROCEDURE — A9500: CPT

## 2022-06-30 PROCEDURE — 93015 CV STRESS TEST SUPVJ I&R: CPT

## 2022-06-30 PROCEDURE — 78452 HT MUSCLE IMAGE SPECT MULT: CPT

## 2022-06-30 RX ORDER — ASPIRIN 81 MG/1
81 TABLET, COATED ORAL DAILY
Qty: 30 | Refills: 2 | Status: ACTIVE | COMMUNITY
Start: 2022-06-30 | End: 1900-01-01

## 2022-07-13 ENCOUNTER — NON-APPOINTMENT (OUTPATIENT)
Age: 57
End: 2022-07-13

## 2022-07-13 ENCOUNTER — APPOINTMENT (OUTPATIENT)
Dept: ELECTROPHYSIOLOGY | Facility: CLINIC | Age: 57
End: 2022-07-13

## 2022-07-13 ENCOUNTER — APPOINTMENT (OUTPATIENT)
Dept: CARDIOLOGY | Facility: CLINIC | Age: 57
End: 2022-07-13

## 2022-07-13 VITALS
TEMPERATURE: 97.5 F | HEART RATE: 91 BPM | RESPIRATION RATE: 18 BRPM | WEIGHT: 240 LBS | DIASTOLIC BLOOD PRESSURE: 94 MMHG | OXYGEN SATURATION: 97 % | BODY MASS INDEX: 34.36 KG/M2 | HEIGHT: 70 IN | SYSTOLIC BLOOD PRESSURE: 173 MMHG

## 2022-07-13 PROCEDURE — 93000 ELECTROCARDIOGRAM COMPLETE: CPT

## 2022-07-13 PROCEDURE — 99215 OFFICE O/P EST HI 40 MIN: CPT

## 2022-07-13 NOTE — DISCUSSION/SUMMARY
[FreeTextEntry1] : In summary, this is a 57 year old man with LVH and frequent PVCs, now with progression to moderate LV systolic dysfunction.  We discussed the potential role of frequent PVCs and the development of cardiomyopathy and options regarding a rhythm control strategy to assess for improvement following elimination of PVCs.  We settled on a return for ablation as the most definitive therapy.  We also discussed that the PVCs, rather than the source of his cardiomyopathy, may just be a bystander.  He will undergo cardiac MRI for evaluation of intracardiac LGE, and further work-up for cardiomyopathy will be pursued should we not see improvement following ablation.\par \par The rationale for the procedure as well as its risks--including but not limited to bleeding, vascular injury, pericardial effusion/tamponade, heart block requiring pacemaker, stroke, and death--were reviewed in detail. After consideration of this information, the decision was made to proceed with the procedure.\par \par Mr. Weller appeared to understand the whole discussion and verbalized that all of his questions were answered to his satisfaction.\par \par Thank you for allowing me to be involved in the care of this pleasant man. Please feel free to contact me with any questions.\par \par \par \par Obed Mendoza MD\par  of Cardiology\par Electrophysiology Section\par [address] [place] [address] Drive, [address] [place] [address]\[place] [address]\par Office: [phone]\par Fax: [phone]\par

## 2022-07-13 NOTE — HISTORY OF PRESENT ILLNESS
[FreeTextEntry1] : Referring Physician: Uriah Bryson MD\par \par Dear Uriah:\par \par Mr. Carlyle Weller was seen in the [facility] Electrophysiology Clinic today. For our records, please allow me to summarize the history and my findings.\par \par This pleasant 57 year old man has a cardiovascular history significant for HL and HTN. He underwent routine stress testing in May and was noted to have frequent PVCs in recovery. Holter monitor in 9/19 showed frequent PVCs at 17%. TTE (9/19) showed LVH with normal biventricular systolic function and an otherwise grossly normal heart. He noted no symptoms associated with the arrhythmia. Beta blockers were uptitrated.  He has since done well but on routine repeat TTE in 6/22 he was found to have new moderate LV systolic dysfunction.  Repeat Holter monitoring showed a PVC burden of 19%, despite beta-blocker therapy.  He continues to note no symptoms of shortness of breath, palpitations, dizziness or syncope.

## 2022-07-13 NOTE — CARDIOLOGY SUMMARY
[de-identified] : 7/13/2022: Sinus rhythm frequent PVCs, likely crux/tricuspid annular in origin. [___] : [unfilled]

## 2022-07-13 NOTE — PHYSICAL EXAM
[General Appearance - Well Developed] : well developed [Normal Appearance] : normal appearance [Well Groomed] : well groomed [General Appearance - Well Nourished] : well nourished [No Deformities] : no deformities [General Appearance - In No Acute Distress] : no acute distress [Normal Conjunctiva] : the conjunctiva exhibited no abnormalities [Eyelids - No Xanthelasma] : the eyelids demonstrated no xanthelasmas [Normal Oral Mucosa] : normal oral mucosa [No Oral Pallor] : no oral pallor [No Oral Cyanosis] : no oral cyanosis [Normal Jugular Venous A Waves Present] : normal jugular venous A waves present [Normal Jugular Venous V Waves Present] : normal jugular venous V waves present [No Jugular Venous Salas A Waves] : no jugular venous salas A waves [Respiration, Rhythm And Depth] : normal respiratory rhythm and effort [Exaggerated Use Of Accessory Muscles For Inspiration] : no accessory muscle use [Auscultation Breath Sounds / Voice Sounds] : lungs were clear to auscultation bilaterally [Heart Rate And Rhythm] : heart rate and rhythm were normal [Heart Sounds] : normal S1 and S2 [Murmurs] : no murmurs present [Abdomen Soft] : soft [Abdomen Tenderness] : non-tender [Abdomen Mass (___ Cm)] : no abdominal mass palpated [Abnormal Walk] : normal gait [Gait - Sufficient For Exercise Testing] : the gait was sufficient for exercise testing [Nail Clubbing] : no clubbing of the fingernails [Cyanosis, Localized] : no localized cyanosis [Petechial Hemorrhages (___cm)] : no petechial hemorrhages [Skin Color & Pigmentation] : normal skin color and pigmentation [] : no rash [No Venous Stasis] : no venous stasis [Skin Lesions] : no skin lesions [No Skin Ulcers] : no skin ulcer [No Xanthoma] : no  xanthoma was observed [Oriented To Time, Place, And Person] : oriented to person, place, and time [Affect] : the affect was normal [Mood] : the mood was normal [No Anxiety] : not feeling anxious

## 2022-08-12 ENCOUNTER — OUTPATIENT (OUTPATIENT)
Dept: OUTPATIENT SERVICES | Facility: HOSPITAL | Age: 57
LOS: 1 days | End: 2022-08-12
Payer: MEDICARE

## 2022-08-12 ENCOUNTER — APPOINTMENT (OUTPATIENT)
Dept: MRI IMAGING | Facility: CLINIC | Age: 57
End: 2022-08-12

## 2022-08-12 DIAGNOSIS — Z00.8 ENCOUNTER FOR OTHER GENERAL EXAMINATION: ICD-10-CM

## 2022-08-12 DIAGNOSIS — I42.9 CARDIOMYOPATHY, UNSPECIFIED: ICD-10-CM

## 2022-08-12 PROCEDURE — A9585: CPT

## 2022-08-12 PROCEDURE — 75561 CARDIAC MRI FOR MORPH W/DYE: CPT | Mod: 26

## 2022-08-12 PROCEDURE — 75561 CARDIAC MRI FOR MORPH W/DYE: CPT

## 2022-09-10 LAB
BASOPHILS # BLD AUTO: 0.02 K/UL
BASOPHILS NFR BLD AUTO: 0.4 %
EOSINOPHIL # BLD AUTO: 0.13 K/UL
EOSINOPHIL NFR BLD AUTO: 2.4 %
HCT VFR BLD CALC: 45.6 %
HGB BLD-MCNC: 16.1 G/DL
IMM GRANULOCYTES NFR BLD AUTO: 0.6 %
LYMPHOCYTES # BLD AUTO: 1.06 K/UL
LYMPHOCYTES NFR BLD AUTO: 19.7 %
MAN DIFF?: NORMAL
MCHC RBC-ENTMCNC: 33.3 PG
MCHC RBC-ENTMCNC: 35.3 GM/DL
MCV RBC AUTO: 94.4 FL
MONOCYTES # BLD AUTO: 0.32 K/UL
MONOCYTES NFR BLD AUTO: 5.9 %
NEUTROPHILS # BLD AUTO: 3.82 K/UL
NEUTROPHILS NFR BLD AUTO: 71 %
PLATELET # BLD AUTO: 169 K/UL
RBC # BLD: 4.83 M/UL
RBC # FLD: 13.4 %
WBC # FLD AUTO: 5.38 K/UL

## 2022-09-12 LAB — ABO + RH PNL BLD: NORMAL

## 2022-09-13 ENCOUNTER — OUTPATIENT (OUTPATIENT)
Dept: INPATIENT UNIT | Facility: HOSPITAL | Age: 57
LOS: 1 days | End: 2022-09-13
Payer: MEDICARE

## 2022-09-13 ENCOUNTER — TRANSCRIPTION ENCOUNTER (OUTPATIENT)
Age: 57
End: 2022-09-13

## 2022-09-13 VITALS
SYSTOLIC BLOOD PRESSURE: 173 MMHG | TEMPERATURE: 98 F | DIASTOLIC BLOOD PRESSURE: 97 MMHG | HEART RATE: 81 BPM | WEIGHT: 240.08 LBS | HEIGHT: 69 IN | OXYGEN SATURATION: 94 % | RESPIRATION RATE: 20 BRPM

## 2022-09-13 VITALS
RESPIRATION RATE: 17 BRPM | OXYGEN SATURATION: 98 % | SYSTOLIC BLOOD PRESSURE: 158 MMHG | DIASTOLIC BLOOD PRESSURE: 101 MMHG | HEART RATE: 89 BPM

## 2022-09-13 DIAGNOSIS — I49.3 VENTRICULAR PREMATURE DEPOLARIZATION: ICD-10-CM

## 2022-09-13 DIAGNOSIS — Z98.890 OTHER SPECIFIED POSTPROCEDURAL STATES: Chronic | ICD-10-CM

## 2022-09-13 LAB
APTT BLD: 33.4 SEC — SIGNIFICANT CHANGE UP (ref 27.5–35.5)
BLD GP AB SCN SERPL QL: NEGATIVE — SIGNIFICANT CHANGE UP
INR BLD: 1.34 RATIO — HIGH (ref 0.88–1.16)
PROTHROM AB SERPL-ACNC: 15.6 SEC — HIGH (ref 10.5–13.4)
RH IG SCN BLD-IMP: POSITIVE — SIGNIFICANT CHANGE UP

## 2022-09-13 RX ORDER — ACETAMINOPHEN 500 MG
2 TABLET ORAL
Qty: 0 | Refills: 0 | DISCHARGE
Start: 2022-09-13

## 2022-09-13 RX ORDER — ACETAMINOPHEN 500 MG
650 TABLET ORAL EVERY 6 HOURS
Refills: 0 | Status: DISCONTINUED | OUTPATIENT
Start: 2022-09-13 | End: 2022-09-13

## 2022-09-13 NOTE — ASU DISCHARGE PLAN (ADULT/PEDIATRIC) - NS MD DC FALL RISK RISK
For information on Fall & Injury Prevention, visit: [email or web address] OR  [email or web address] OR  https://www.cdc.gov/steadi/patient.html

## 2022-09-13 NOTE — H&P CARDIOLOGY - NSICDXPASTMEDICALHX_GEN_ALL_CORE_FT
PAST MEDICAL HISTORY:  Cardiomyopathy     HLD (hyperlipidemia)     HTN (hypertension)     Obesity, morbid     Ventricular premature beats

## 2022-09-13 NOTE — ASU DISCHARGE PLAN (ADULT/PEDIATRIC) - PROVIDER TOKENS
PROVIDER:[TOKEN:[[id number]],SCHEDULEDAPPT:[11/02/2022],SCHEDULEDAPPTTIME:[04:30 PM],ESTABLISHEDPATIENT:[T]]

## 2022-09-13 NOTE — PATIENT PROFILE ADULT - FUNCTIONAL ASSESSMENT - BASIC MOBILITY 3.
DASH/TLC Renal diet  Replete electrolytes K>4, Mg>2, Calcium 4 = No assist / stand by assistance DASH/TLC Renal diet  Replete electrolytes K>4, Mg>2, Calcium >8, Phos >3, Bicarb >20

## 2022-09-13 NOTE — PATIENT PROFILE ADULT - FALL HARM RISK - UNIVERSAL INTERVENTIONS
Bed in lowest position, wheels locked, appropriate side rails in place/Call bell, personal items and telephone in reach/Instruct patient to call for assistance before getting out of bed or chair/Non-slip footwear when patient is out of bed/[place] to call system/Physically safe environment - no spills, clutter or unnecessary equipment/Purposeful Proactive Rounding/Room/bathroom lighting operational, light cord in reach

## 2022-09-13 NOTE — H&P CARDIOLOGY - HISTORY OF PRESENT ILLNESS
58y/o with PMH of HTN, HLD, He underwent routine stress testing in May and was noted to have frequent PVCs in recovery. Holter monitor in 9/19 showed frequent PVCs at 17%. TTE (9/19) showed LVH with normal biventricular systolic function and an otherwise grossly normal heart. He noted no symptoms associated with the arrhythmia. Beta blockers were uptitrated. He has since done well but on routine repeat TTE in 6/22 he was found to have new moderate LV systolic dysfunction. Repeat Holter monitoring showed a PVC burden of 19%, despite beta-blocker therapy. He continues to note no symptoms of shortness of breath, palpitations, dizziness or syncope. Seen & evaluated by Dr Lazo & now recommends for PVC ablation.

## 2022-09-13 NOTE — ASU DISCHARGE PLAN (ADULT/PEDIATRIC) - CARE PROVIDER_API CALL
Obed Mendoza)  Cardiac Electrophysiology; Cardiology  [address] [place] [address]  Phone: [phone]  Fax: [phone]  Established Patient  Scheduled Appointment: 11/02/2022 04:30 PM

## 2022-09-14 ENCOUNTER — NON-APPOINTMENT (OUTPATIENT)
Age: 57
End: 2022-09-14

## 2022-09-14 PROCEDURE — 86901 BLOOD TYPING SEROLOGIC RH(D): CPT

## 2022-09-14 PROCEDURE — 93654 COMPRE EP EVAL TX VT: CPT

## 2022-09-14 PROCEDURE — 86850 RBC ANTIBODY SCREEN: CPT

## 2022-09-14 PROCEDURE — 93662 INTRACARDIAC ECG (ICE): CPT

## 2022-09-14 PROCEDURE — C1730: CPT

## 2022-09-14 PROCEDURE — 93005 ELECTROCARDIOGRAM TRACING: CPT

## 2022-09-14 PROCEDURE — C1732: CPT

## 2022-09-14 PROCEDURE — C1760: CPT

## 2022-09-14 PROCEDURE — C1889: CPT

## 2022-09-14 PROCEDURE — C1766: CPT

## 2022-09-14 PROCEDURE — 85730 THROMBOPLASTIN TIME PARTIAL: CPT

## 2022-09-14 PROCEDURE — 85610 PROTHROMBIN TIME: CPT

## 2022-09-14 PROCEDURE — C1759: CPT

## 2022-09-14 PROCEDURE — 86900 BLOOD TYPING SEROLOGIC ABO: CPT

## 2022-09-14 PROCEDURE — 93623 PRGRMD STIMJ&PACG IV RX NFS: CPT

## 2022-09-14 PROCEDURE — C1894: CPT

## 2022-09-14 PROCEDURE — C1769: CPT

## 2022-09-22 ENCOUNTER — NON-APPOINTMENT (OUTPATIENT)
Age: 57
End: 2022-09-22

## 2022-09-22 ENCOUNTER — APPOINTMENT (OUTPATIENT)
Dept: CARDIOLOGY | Facility: CLINIC | Age: 57
End: 2022-09-22

## 2022-09-22 VITALS — DIASTOLIC BLOOD PRESSURE: 95 MMHG | SYSTOLIC BLOOD PRESSURE: 160 MMHG

## 2022-09-22 VITALS
RESPIRATION RATE: 19 BRPM | SYSTOLIC BLOOD PRESSURE: 174 MMHG | WEIGHT: 240 LBS | OXYGEN SATURATION: 94 % | TEMPERATURE: 97.3 F | HEART RATE: 82 BPM | BODY MASS INDEX: 34.36 KG/M2 | HEIGHT: 70 IN | DIASTOLIC BLOOD PRESSURE: 97 MMHG

## 2022-09-22 PROCEDURE — 93000 ELECTROCARDIOGRAM COMPLETE: CPT

## 2022-09-22 PROCEDURE — 99215 OFFICE O/P EST HI 40 MIN: CPT

## 2022-09-22 RX ORDER — BLOOD-GLUCOSE METER
KIT MISCELLANEOUS
Qty: 1 | Refills: 0 | Status: ACTIVE | COMMUNITY
Start: 2022-09-22 | End: 1900-01-01

## 2022-09-23 PROBLEM — E78.5 HYPERLIPIDEMIA, UNSPECIFIED: Chronic | Status: ACTIVE | Noted: 2022-09-13

## 2022-09-23 PROBLEM — I49.3 VENTRICULAR PREMATURE DEPOLARIZATION: Chronic | Status: ACTIVE | Noted: 2022-09-13

## 2022-09-23 PROBLEM — I42.9 CARDIOMYOPATHY, UNSPECIFIED: Chronic | Status: ACTIVE | Noted: 2022-09-13

## 2022-09-23 PROBLEM — I10 ESSENTIAL (PRIMARY) HYPERTENSION: Chronic | Status: ACTIVE | Noted: 2022-09-13

## 2022-09-23 PROBLEM — E66.01 MORBID (SEVERE) OBESITY DUE TO EXCESS CALORIES: Chronic | Status: ACTIVE | Noted: 2022-09-13

## 2022-11-02 ENCOUNTER — APPOINTMENT (OUTPATIENT)
Dept: ELECTROPHYSIOLOGY | Facility: CLINIC | Age: 57
End: 2022-11-02

## 2022-11-30 LAB
ANION GAP SERPL CALC-SCNC: 11 MMOL/L
BUN SERPL-MCNC: 19 MG/DL
CALCIUM SERPL-MCNC: 9 MG/DL
CHLORIDE SERPL-SCNC: 99 MMOL/L
CO2 SERPL-SCNC: 27 MMOL/L
CREAT SERPL-MCNC: 0.66 MG/DL
EGFR: 109 ML/MIN/1.73M2
GLUCOSE SERPL-MCNC: 115 MG/DL
POTASSIUM SERPL-SCNC: 4.9 MMOL/L
SODIUM SERPL-SCNC: 137 MMOL/L

## 2023-01-17 ENCOUNTER — RX RENEWAL (OUTPATIENT)
Age: 58
End: 2023-01-17

## 2023-01-23 ENCOUNTER — RX RENEWAL (OUTPATIENT)
Age: 58
End: 2023-01-23

## 2023-03-10 ENCOUNTER — RX RENEWAL (OUTPATIENT)
Age: 58
End: 2023-03-10

## 2023-03-20 ENCOUNTER — NON-APPOINTMENT (OUTPATIENT)
Age: 58
End: 2023-03-20

## 2023-03-20 ENCOUNTER — APPOINTMENT (OUTPATIENT)
Dept: CARDIOLOGY | Facility: CLINIC | Age: 58
End: 2023-03-20
Payer: MEDICARE

## 2023-03-20 VITALS
DIASTOLIC BLOOD PRESSURE: 95 MMHG | OXYGEN SATURATION: 97 % | RESPIRATION RATE: 17 BRPM | SYSTOLIC BLOOD PRESSURE: 174 MMHG | TEMPERATURE: 95.4 F | WEIGHT: 238 LBS | HEIGHT: 70 IN | HEART RATE: 84 BPM | BODY MASS INDEX: 34.07 KG/M2

## 2023-03-20 PROCEDURE — 99215 OFFICE O/P EST HI 40 MIN: CPT

## 2023-03-20 PROCEDURE — 93000 ELECTROCARDIOGRAM COMPLETE: CPT

## 2023-03-20 NOTE — DISCUSSION/SUMMARY
[PVCs] : ectopic ventricular beats [Stable] : stable [None] : There are no changes in medication management [Hyperlipidemia] : hyperlipidemia [Lipids Test Panel] : a fasting lipid profile [Smoking] : smoking [Family History of CAD] : family history of CAD [Hypertension] : hypertension [Not Responding to Treatment] : not responding to treatment [Outpatient Evaluation] : outpatient evaluation [Ambulatory BP Monitoring] : ambulatory blood pressure monitoring [Medication Changes Per Orders] : Medication changes are as documented in orders [Exercise Regimen] : an exercise regimen [Weight Loss] : weight loss [Low Sodium Diet] : low sodium diet [Minutes Spent: ___] : for [unfilled] ~Uminutes [Cardiomyopathy] : cardiomyopathy [Echocardiogram] : echocardiogram [de-identified] : s/p ablaton [de-identified] : ep f/u with dr lacey [de-identified] :  metoprolol 50 bid [de-identified] : hi salt diet [de-identified] : cont valsarten 320 mg daily,  add hctz qod [FreeTextEntry2] : pts brother present, reviewed hosp records,EP procedure notes, prior meds/doses, prior labs, low sodium diet conseling

## 2023-03-20 NOTE — REASON FOR VISIT
[Follow-Up - Clinic] : a clinic follow-up of [Hyperlipidemia] : hyperlipidemia [Hypertension] : hypertension [FreeTextEntry2] : pt s/p changed cardiologists with multiple risk factors, denies sscp or sob, remote h/o syncope 2014 with hi BP, no dizziness or syncope since, now s/p PVC ablation,feels well

## 2023-03-20 NOTE — PHYSICAL EXAM
[General Appearance - Well Developed] : well developed [Normal Appearance] : normal appearance [Well Groomed] : well groomed [General Appearance - Well Nourished] : well nourished [No Deformities] : no deformities [General Appearance - In No Acute Distress] : no acute distress [Normal Conjunctiva] : the conjunctiva exhibited no abnormalities [Eyelids - No Xanthelasma] : the eyelids demonstrated no xanthelasmas [Normal Oral Mucosa] : normal oral mucosa [No Oral Pallor] : no oral pallor [No Oral Cyanosis] : no oral cyanosis [Normal Jugular Venous A Waves Present] : normal jugular venous A waves present [Normal Jugular Venous V Waves Present] : normal jugular venous V waves present [No Jugular Venous Salas A Waves] : no jugular venous salas A waves [Respiration, Rhythm And Depth] : normal respiratory rhythm and effort [Exaggerated Use Of Accessory Muscles For Inspiration] : no accessory muscle use [Auscultation Breath Sounds / Voice Sounds] : lungs were clear to auscultation bilaterally [Abdomen Soft] : soft [Abdomen Tenderness] : non-tender [Abdomen Mass (___ Cm)] : no abdominal mass palpated [Abnormal Walk] : normal gait [Gait - Sufficient For Exercise Testing] : the gait was sufficient for exercise testing [Nail Clubbing] : no clubbing of the fingernails [Cyanosis, Localized] : no localized cyanosis [Petechial Hemorrhages (___cm)] : no petechial hemorrhages [Skin Color & Pigmentation] : normal skin color and pigmentation [] : no rash [No Venous Stasis] : no venous stasis [Skin Lesions] : no skin lesions [No Skin Ulcers] : no skin ulcer [No Xanthoma] : no  xanthoma was observed [Oriented To Time, Place, And Person] : oriented to person, place, and time [Affect] : the affect was normal [Mood] : the mood was normal [No Anxiety] : not feeling anxious [Normal Rate] : normal [Normal S1] : normal S1 [Normal S2] : normal S2 [No Murmur] : no murmurs heard [2+] : left 2+ [No Abnormalities] : the abdominal aorta was not enlarged and no bruit was heard [No Pitting Edema] : no pitting edema present [S3] : no S3 [S4] : no S4 [Right Carotid Bruit] : no bruit heard over the right carotid [Left Carotid Bruit] : no bruit heard over the left carotid [Right Femoral Bruit] : no bruit heard over the right femoral artery [Left Femoral Bruit] : no bruit heard over the left femoral artery

## 2023-03-20 NOTE — REASON FOR VISIT
[Hyperlipidemia] : hyperlipidemia [Hypertension] : hypertension [Follow-Up - Clinic] : a clinic follow-up of [FreeTextEntry2] : pt s/p changed cardiologists with multiple risk factors, denies sscp or sob, remote h/o syncope 2014 with hi BP, no dizziness or syncope since, now s/p PVC ablation,feels well

## 2023-03-20 NOTE — DISCUSSION/SUMMARY
[PVCs] : ectopic ventricular beats [Stable] : stable [None] : There are no changes in medication management [Cardiomyopathy] : cardiomyopathy [Hyperlipidemia] : hyperlipidemia [Lipids Test Panel] : a fasting lipid profile [Smoking] : smoking [Family History of CAD] : family history of CAD [Hypertension] : hypertension [Not Responding to Treatment] : not responding to treatment [Outpatient Evaluation] : outpatient evaluation [Ambulatory BP Monitoring] : ambulatory blood pressure monitoring [Echocardiogram] : echocardiogram [Medication Changes Per Orders] : Medication changes are as documented in orders [Exercise Regimen] : an exercise regimen [Weight Loss] : weight loss [Low Sodium Diet] : low sodium diet [Minutes Spent: ___] : for [unfilled] ~Uminutes [de-identified] : s/p ablaton [de-identified] :  metoprolol 50 bid [de-identified] : ep f/u with dr lacey [de-identified] : hi salt diet [de-identified] : cont valsarten 320 mg daily,  add hctz qod [FreeTextEntry2] : pts brother present, reviewed hosp records,EP procedure notes, prior meds/doses, prior labs, low sodium diet conseling

## 2023-03-25 NOTE — OBJECTIVE
DATE OF SERVICE: 03/24/2023



ELECTROENCEPHALOGRAM REPORT



PREAMBLE:

This is a 71-year-old male with new onset focal seizure and brain mass.



EEG FINDINGS:

This is a 21-channel digital EEG recorded with video component utilizing 10/20

international system with referential and bipolar montages.  Background consists
of

well developed, well regulated moderate voltage activity in 8 to 9 hertz alpha.

Background is posterior dominant and reactive to eye opening and closing.  
Photic

driving response was not seen.  Drowsiness was seen with appearance of 
bilaterally

symmetric theta frequency rhythm.  Stage 2 sleep was attained with presence of 
sleep

spindles and vertex waves.  No focal or generalized epileptiform activity was 
seen.

EKG channel showed no obvious arrhythmia.



IMPRESSION:

This is a normal EEG during wakefulness, drowsiness and stage 2 sleep.  No 
focal,

lateralized or epileptiform activity was seen.





TUCKER / CARLOSN: [id number] / Job#: [id number]

MTDD [History reviewed] : History reviewed. [Medications and Allergies reviewed] : Medications and allergies reviewed.

## 2023-04-02 ENCOUNTER — RX RENEWAL (OUTPATIENT)
Age: 58
End: 2023-04-02

## 2023-04-10 ENCOUNTER — APPOINTMENT (OUTPATIENT)
Dept: CARDIOLOGY | Facility: CLINIC | Age: 58
End: 2023-04-10
Payer: MEDICARE

## 2023-04-10 ENCOUNTER — NON-APPOINTMENT (OUTPATIENT)
Age: 58
End: 2023-04-10

## 2023-04-10 ENCOUNTER — APPOINTMENT (OUTPATIENT)
Dept: ELECTROPHYSIOLOGY | Facility: CLINIC | Age: 58
End: 2023-04-10
Payer: MEDICARE

## 2023-04-10 VITALS
BODY MASS INDEX: 34.07 KG/M2 | OXYGEN SATURATION: 93 % | HEIGHT: 70 IN | DIASTOLIC BLOOD PRESSURE: 91 MMHG | RESPIRATION RATE: 20 BRPM | SYSTOLIC BLOOD PRESSURE: 187 MMHG | TEMPERATURE: 92.3 F | WEIGHT: 238 LBS | HEART RATE: 87 BPM

## 2023-04-10 DIAGNOSIS — I42.9 CARDIOMYOPATHY, UNSPECIFIED: ICD-10-CM

## 2023-04-10 DIAGNOSIS — I49.3 VENTRICULAR PREMATURE DEPOLARIZATION: ICD-10-CM

## 2023-04-10 PROCEDURE — 93000 ELECTROCARDIOGRAM COMPLETE: CPT

## 2023-04-10 PROCEDURE — 99214 OFFICE O/P EST MOD 30 MIN: CPT

## 2023-04-10 NOTE — DISCUSSION/SUMMARY
[FreeTextEntry1] : In summary, this is a 58 year old man with LVH and frequent PVCs and nonischemic cardiomyopathy now status post ablation of the posteromedial.  PVC.  I am pleased to see him doing well today with improvement in energy levels.  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  I have given him a prescription for Norvasc 5 mg daily for treatment of refractory hypertension.  He will follow-up as needed for further arrhythmic care.\par \par Mr. Weller appeared to understand the whole discussion and verbalized that all of his questions were answered to his satisfaction.\par \par Thank you for allowing me to be involved in the care of this pleasant man. Please feel free to contact me with any questions.\par \par \par \par Obed Mendoza MD\par  of Cardiology\par Electrophysiology Section\[address], [address] [place] [address]\[place] [address]\par Office: [phone]\par Fax: [phone]\par  [EKG obtained to assist in diagnosis and management of assessed problem(s)] : EKG obtained to assist in diagnosis and management of assessed problem(s)

## 2023-04-10 NOTE — CARDIOLOGY SUMMARY
[___] : [unfilled] [de-identified] : 4/10/2023: Sinus rhythm.  No PVCs.\par 7/13/2022: Sinus rhythm frequent PVCs, likely crux/tricuspid annular in origin.

## 2023-04-10 NOTE — HISTORY OF PRESENT ILLNESS
[FreeTextEntry1] : Referring Physician: Uriah Bryson MD\par \par Dear Uriah:\par \par Mr. Carlyle Weller was seen in the [facility] Electrophysiology Clinic today. For our records, please allow me to summarize the history and my findings.\par \par This pleasant 58 year old man has a cardiovascular history significant for HL and HTN. He underwent routine stress testing in May and was noted to have frequent PVCs in recovery. Holter monitor in 9/19 showed frequent PVCs at 17%. TTE (9/19) showed LVH with normal biventricular systolic function and an otherwise grossly normal heart.  Follow-up evaluation showed a decline in left ventricular ejection fraction to 42% and a persistent PVC burden of almost 20%.  He was brought in for EP study during which she was found to have PVCs originating from the posterior medial papillary muscle, which were ablated.  He returns now for follow-up noting significant improvement in energy levels.  He has had no chest pain, shortness of breath, palpitations, dizziness or syncope.  He notes normal access site healing.  His blood pressure remains consistently high.

## 2023-04-14 ENCOUNTER — NON-APPOINTMENT (OUTPATIENT)
Age: 58
End: 2023-04-14

## 2023-04-17 PROCEDURE — 93224 XTRNL ECG REC UP TO 48 HRS: CPT

## 2023-06-07 ENCOUNTER — RX RENEWAL (OUTPATIENT)
Age: 58
End: 2023-06-07

## 2023-06-29 ENCOUNTER — APPOINTMENT (OUTPATIENT)
Dept: INTERNAL MEDICINE | Facility: CLINIC | Age: 58
End: 2023-06-29

## 2023-07-06 ENCOUNTER — RX RENEWAL (OUTPATIENT)
Age: 58
End: 2023-07-06

## 2023-07-24 ENCOUNTER — NON-APPOINTMENT (OUTPATIENT)
Age: 58
End: 2023-07-24

## 2023-07-24 ENCOUNTER — APPOINTMENT (OUTPATIENT)
Dept: CARDIOLOGY | Facility: CLINIC | Age: 58
End: 2023-07-24
Payer: MEDICARE

## 2023-07-24 VITALS
HEIGHT: 70 IN | BODY MASS INDEX: 34.36 KG/M2 | DIASTOLIC BLOOD PRESSURE: 93 MMHG | WEIGHT: 240 LBS | OXYGEN SATURATION: 96 % | HEART RATE: 75 BPM | TEMPERATURE: 92.1 F | RESPIRATION RATE: 19 BRPM | SYSTOLIC BLOOD PRESSURE: 165 MMHG

## 2023-07-24 VITALS — DIASTOLIC BLOOD PRESSURE: 90 MMHG | SYSTOLIC BLOOD PRESSURE: 172 MMHG

## 2023-07-24 PROCEDURE — 93306 TTE W/DOPPLER COMPLETE: CPT

## 2023-07-24 PROCEDURE — 99215 OFFICE O/P EST HI 40 MIN: CPT

## 2023-07-24 PROCEDURE — 93000 ELECTROCARDIOGRAM COMPLETE: CPT

## 2023-08-09 ENCOUNTER — NON-APPOINTMENT (OUTPATIENT)
Age: 58
End: 2023-08-09

## 2023-08-27 ENCOUNTER — RX RENEWAL (OUTPATIENT)
Age: 58
End: 2023-08-27

## 2023-10-05 ENCOUNTER — RX RENEWAL (OUTPATIENT)
Age: 58
End: 2023-10-05

## 2023-10-10 ENCOUNTER — APPOINTMENT (OUTPATIENT)
Dept: FAMILY MEDICINE | Facility: CLINIC | Age: 58
End: 2023-10-10
Payer: MEDICARE

## 2023-10-10 VITALS
HEIGHT: 70 IN | OXYGEN SATURATION: 95 % | BODY MASS INDEX: 35.5 KG/M2 | HEART RATE: 81 BPM | SYSTOLIC BLOOD PRESSURE: 142 MMHG | WEIGHT: 248 LBS | DIASTOLIC BLOOD PRESSURE: 88 MMHG | RESPIRATION RATE: 16 BRPM | TEMPERATURE: 96.9 F

## 2023-10-10 DIAGNOSIS — Z00.00 ENCOUNTER FOR GENERAL ADULT MEDICAL EXAMINATION W/OUT ABNORMAL FINDINGS: ICD-10-CM

## 2023-10-10 DIAGNOSIS — R25.1 TREMOR, UNSPECIFIED: ICD-10-CM

## 2023-10-10 DIAGNOSIS — E55.9 VITAMIN D DEFICIENCY, UNSPECIFIED: ICD-10-CM

## 2023-10-10 DIAGNOSIS — L30.9 DERMATITIS, UNSPECIFIED: ICD-10-CM

## 2023-10-10 DIAGNOSIS — Z13.1 ENCOUNTER FOR SCREENING FOR DIABETES MELLITUS: ICD-10-CM

## 2023-10-10 DIAGNOSIS — R35.0 FREQUENCY OF MICTURITION: ICD-10-CM

## 2023-10-10 DIAGNOSIS — Z80.51 FAMILY HISTORY OF MALIGNANT NEOPLASM OF KIDNEY: ICD-10-CM

## 2023-10-10 DIAGNOSIS — Z12.5 ENCOUNTER FOR SCREENING FOR MALIGNANT NEOPLASM OF PROSTATE: ICD-10-CM

## 2023-10-10 DIAGNOSIS — Z13.29 ENCOUNTER FOR SCREENING FOR OTHER SUSPECTED ENDOCRINE DISORDER: ICD-10-CM

## 2023-10-10 DIAGNOSIS — Z83.49 FAMILY HISTORY OF OTHER ENDOCRINE, NUTRITIONAL AND METABOLIC DISEASES: ICD-10-CM

## 2023-10-10 PROCEDURE — 90471 IMMUNIZATION ADMIN: CPT

## 2023-10-10 PROCEDURE — G0008: CPT

## 2023-10-10 PROCEDURE — G0439: CPT

## 2023-10-10 PROCEDURE — 90686 IIV4 VACC NO PRSV 0.5 ML IM: CPT

## 2023-10-10 RX ORDER — KETOCONAZOLE 20 MG/G
2 CREAM TOPICAL TWICE DAILY
Qty: 1 | Refills: 0 | Status: ACTIVE | COMMUNITY
Start: 2023-10-10 | End: 1900-01-01

## 2023-10-13 ENCOUNTER — TRANSCRIPTION ENCOUNTER (OUTPATIENT)
Age: 58
End: 2023-10-13

## 2023-10-16 ENCOUNTER — TRANSCRIPTION ENCOUNTER (OUTPATIENT)
Age: 58
End: 2023-10-16

## 2023-10-17 ENCOUNTER — APPOINTMENT (OUTPATIENT)
Dept: UROLOGY | Facility: CLINIC | Age: 58
End: 2023-10-17
Payer: MEDICARE

## 2023-10-17 VITALS
WEIGHT: 248 LBS | TEMPERATURE: 98.2 F | SYSTOLIC BLOOD PRESSURE: 158 MMHG | DIASTOLIC BLOOD PRESSURE: 80 MMHG | BODY MASS INDEX: 35.5 KG/M2 | HEIGHT: 70 IN | HEART RATE: 85 BPM | OXYGEN SATURATION: 96 %

## 2023-10-17 DIAGNOSIS — N40.0 BENIGN PROSTATIC HYPERPLASIA WITHOUT LOWER URINARY TRACT SYMPMS: ICD-10-CM

## 2023-10-17 PROCEDURE — 99204 OFFICE O/P NEW MOD 45 MIN: CPT

## 2023-10-17 PROCEDURE — 51798 US URINE CAPACITY MEASURE: CPT

## 2023-10-19 LAB
APPEARANCE: CLEAR
BACTERIA UR CULT: NORMAL
BACTERIA: NEGATIVE /HPF
BILIRUBIN URINE: NEGATIVE
BLOOD URINE: NEGATIVE
CAST: 0 /LPF
COLOR: YELLOW
EPITHELIAL CELLS: 0 /HPF
GLUCOSE QUALITATIVE U: 100 MG/DL
KETONES URINE: NEGATIVE MG/DL
LEUKOCYTE ESTERASE URINE: NEGATIVE
MICROSCOPIC-UA: NORMAL
NITRITE URINE: NEGATIVE
PH URINE: 6.5
PROTEIN URINE: 30 MG/DL
PSA FREE FLD-MCNC: 24 %
PSA FREE SERPL-MCNC: 0.35 NG/ML
PSA SERPL-MCNC: 1.43 NG/ML
RED BLOOD CELLS URINE: 1 /HPF
SPECIFIC GRAVITY URINE: 1.02
UROBILINOGEN URINE: 1 MG/DL
WHITE BLOOD CELLS URINE: 1 /HPF

## 2023-10-23 LAB
25(OH)D3 SERPL-MCNC: 22.4 NG/ML
ALBUMIN SERPL ELPH-MCNC: 4.4 G/DL
ALP BLD-CCNC: 65 U/L
ALT SERPL-CCNC: 41 U/L
ANION GAP SERPL CALC-SCNC: 11 MMOL/L
AST SERPL-CCNC: 28 U/L
BASOPHILS # BLD AUTO: 0.02 K/UL
BASOPHILS NFR BLD AUTO: 0.4 %
BILIRUB SERPL-MCNC: 0.5 MG/DL
BUN SERPL-MCNC: 18 MG/DL
CALCIUM SERPL-MCNC: 9.3 MG/DL
CHLORIDE SERPL-SCNC: 99 MMOL/L
CHOLEST SERPL-MCNC: 161 MG/DL
CO2 SERPL-SCNC: 28 MMOL/L
CREAT SERPL-MCNC: 0.72 MG/DL
EGFR: 105 ML/MIN/1.73M2
EOSINOPHIL # BLD AUTO: 0.1 K/UL
EOSINOPHIL NFR BLD AUTO: 2.1 %
ESTIMATED AVERAGE GLUCOSE: 103 MG/DL
GLUCOSE SERPL-MCNC: 143 MG/DL
HBA1C MFR BLD HPLC: 5.2 %
HCT VFR BLD CALC: 43.6 %
HDLC SERPL-MCNC: 37 MG/DL
HGB BLD-MCNC: 15.2 G/DL
IMM GRANULOCYTES NFR BLD AUTO: 0.8 %
LDLC SERPL CALC-MCNC: 100 MG/DL
LYMPHOCYTES # BLD AUTO: 1.08 K/UL
LYMPHOCYTES NFR BLD AUTO: 22.3 %
MAN DIFF?: NORMAL
MCHC RBC-ENTMCNC: 33.7 PG
MCHC RBC-ENTMCNC: 34.9 GM/DL
MCV RBC AUTO: 96.7 FL
MONOCYTES # BLD AUTO: 0.39 K/UL
MONOCYTES NFR BLD AUTO: 8.1 %
NEUTROPHILS # BLD AUTO: 3.21 K/UL
NEUTROPHILS NFR BLD AUTO: 66.3 %
NONHDLC SERPL-MCNC: 125 MG/DL
PLATELET # BLD AUTO: 155 K/UL
POTASSIUM SERPL-SCNC: 4.9 MMOL/L
PROT SERPL-MCNC: 7.1 G/DL
PSA FREE FLD-MCNC: 25 %
PSA FREE SERPL-MCNC: 0.35 NG/ML
PSA SERPL-MCNC: 1.39 NG/ML
RBC # BLD: 4.51 M/UL
RBC # FLD: 13.6 %
SODIUM SERPL-SCNC: 138 MMOL/L
TRIGL SERPL-MCNC: 141 MG/DL
TSH SERPL-ACNC: 1.02 UIU/ML
WBC # FLD AUTO: 4.84 K/UL

## 2023-10-24 DIAGNOSIS — Z14.8 GENETIC CARRIER OF OTHER DISEASE: ICD-10-CM

## 2023-10-24 LAB — TM INTERPRETATION: NORMAL

## 2023-11-06 ENCOUNTER — APPOINTMENT (OUTPATIENT)
Dept: CARDIOLOGY | Facility: CLINIC | Age: 58
End: 2023-11-06
Payer: MEDICARE

## 2023-11-06 ENCOUNTER — NON-APPOINTMENT (OUTPATIENT)
Age: 58
End: 2023-11-06

## 2023-11-06 VITALS
WEIGHT: 254 LBS | TEMPERATURE: 97.5 F | HEART RATE: 102 BPM | DIASTOLIC BLOOD PRESSURE: 91 MMHG | BODY MASS INDEX: 36.36 KG/M2 | SYSTOLIC BLOOD PRESSURE: 188 MMHG | RESPIRATION RATE: 18 BRPM | OXYGEN SATURATION: 96 % | HEIGHT: 70 IN

## 2023-11-06 VITALS — SYSTOLIC BLOOD PRESSURE: 156 MMHG | DIASTOLIC BLOOD PRESSURE: 82 MMHG

## 2023-11-06 PROCEDURE — 99215 OFFICE O/P EST HI 40 MIN: CPT

## 2023-11-06 PROCEDURE — 93000 ELECTROCARDIOGRAM COMPLETE: CPT

## 2023-11-28 ENCOUNTER — APPOINTMENT (OUTPATIENT)
Dept: UROLOGY | Facility: CLINIC | Age: 58
End: 2023-11-28
Payer: MEDICARE

## 2023-11-28 VITALS
HEART RATE: 93 BPM | TEMPERATURE: 97.8 F | SYSTOLIC BLOOD PRESSURE: 155 MMHG | DIASTOLIC BLOOD PRESSURE: 83 MMHG | OXYGEN SATURATION: 96 % | HEIGHT: 70 IN | WEIGHT: 254 LBS | BODY MASS INDEX: 36.36 KG/M2

## 2023-11-28 PROCEDURE — 51741 ELECTRO-UROFLOWMETRY FIRST: CPT

## 2023-11-28 PROCEDURE — 51798 US URINE CAPACITY MEASURE: CPT

## 2023-11-28 PROCEDURE — 99213 OFFICE O/P EST LOW 20 MIN: CPT

## 2023-12-04 ENCOUNTER — RX RENEWAL (OUTPATIENT)
Age: 58
End: 2023-12-04

## 2024-01-05 ENCOUNTER — TRANSCRIPTION ENCOUNTER (OUTPATIENT)
Age: 59
End: 2024-01-05

## 2024-01-06 RX ORDER — TRIAMCINOLONE ACETONIDE 0.25 MG/G
0.03 OINTMENT TOPICAL TWICE DAILY
Qty: 1 | Refills: 0 | Status: ACTIVE | COMMUNITY
Start: 2023-10-10 | End: 1900-01-01

## 2024-02-27 ENCOUNTER — RX RENEWAL (OUTPATIENT)
Age: 59
End: 2024-02-27

## 2024-03-12 ENCOUNTER — APPOINTMENT (OUTPATIENT)
Dept: UROLOGY | Facility: CLINIC | Age: 59
End: 2024-03-12
Payer: MEDICARE

## 2024-03-12 VITALS
HEART RATE: 94 BPM | DIASTOLIC BLOOD PRESSURE: 80 MMHG | BODY MASS INDEX: 36.36 KG/M2 | SYSTOLIC BLOOD PRESSURE: 145 MMHG | WEIGHT: 254 LBS | HEIGHT: 70 IN | TEMPERATURE: 97.5 F | OXYGEN SATURATION: 95 %

## 2024-03-12 DIAGNOSIS — N40.1 BENIGN PROSTATIC HYPERPLASIA WITH LOWER URINARY TRACT SYMPMS: ICD-10-CM

## 2024-03-12 DIAGNOSIS — N13.8 BENIGN PROSTATIC HYPERPLASIA WITH LOWER URINARY TRACT SYMPMS: ICD-10-CM

## 2024-03-12 PROCEDURE — 99213 OFFICE O/P EST LOW 20 MIN: CPT

## 2024-03-12 PROCEDURE — 51741 ELECTRO-UROFLOWMETRY FIRST: CPT

## 2024-03-12 PROCEDURE — 51798 US URINE CAPACITY MEASURE: CPT

## 2024-03-12 NOTE — PHYSICAL EXAM
[General Appearance - Well Developed] : well developed [General Appearance - Well Nourished] : well nourished [Well Groomed] : well groomed [Normal Appearance] : normal appearance [General Appearance - In No Acute Distress] : no acute distress [Abdomen Soft] : soft [Abdomen Tenderness] : non-tender [Costovertebral Angle Tenderness] : no ~M costovertebral angle tenderness [Edema] : no peripheral edema [] : no respiratory distress [Exaggerated Use Of Accessory Muscles For Inspiration] : no accessory muscle use [Respiration, Rhythm And Depth] : normal respiratory rhythm and effort [Affect] : the affect was normal [Oriented To Time, Place, And Person] : oriented to person, place, and time [Mood] : the mood was normal [Not Anxious] : not anxious [Normal Station and Gait] : the gait and station were normal for the patient's age

## 2024-03-12 NOTE — REASON FOR VISIT
[Follow-up Visit ___] : a follow-up visit  for [unfilled] [Spouse] : spouse [Initial Visit ___] : [unfilled] is here today for an initial visit  for [unfilled] [Family Member] : family member

## 2024-03-12 NOTE — SIGNATURES
[TextEntry] : Kike Paz M.D. Minimally Invasive and Robotic Urologic Surgery [facility] | [facility]  of Urology [employer] [facility] Tel: [phone] | Fax: [phone] | email: [email or web address]

## 2024-03-12 NOTE — HISTORY OF PRESENT ILLNESS
[FreeTextEntry1] : Mr. ANN is a 58 year-year-old White M with mild mental retardation, cardiomyopathy, HLD, HTN, PVCs s/p ablation, Vitamin D Def current smoker of cigars (few a day. Comes today to clinic for urinary symptoms. Endorsing moderate to severe LUTS for at least a year (see IPSS), both voiding and storage symptoms. HBA1c >7, BMI >30 Uncle diagnosed with prostate cancer at age 80.  Father  of kidney cancer at age 51, brother has been diagnosed with stage I kidney cancer, sister had renal lesions removed benign pathology.   3/12/2024 Patient endorsing remarkable improvement with tamsulosin.  Pleased with results.  Wishes to continue. Denies fevers, chills, flank pain, hematuria, AUR.  Uroflow low volume, PVR 89 cc.

## 2024-03-26 RX ORDER — TAMSULOSIN HYDROCHLORIDE 0.4 MG/1
0.4 CAPSULE ORAL
Qty: 30 | Refills: 5 | Status: ACTIVE | COMMUNITY
Start: 2023-10-17 | End: 1900-01-01

## 2024-03-27 RX ORDER — METOPROLOL SUCCINATE 50 MG/1
50 TABLET, EXTENDED RELEASE ORAL
Qty: 180 | Refills: 0 | Status: ACTIVE | COMMUNITY
Start: 2019-02-11 | End: 1900-01-01

## 2024-03-27 RX ORDER — VALSARTAN 320 MG/1
320 TABLET, COATED ORAL
Qty: 90 | Refills: 0 | Status: ACTIVE | COMMUNITY
Start: 2022-06-30 | End: 1900-01-01

## 2024-04-09 ENCOUNTER — APPOINTMENT (OUTPATIENT)
Dept: FAMILY MEDICINE | Facility: CLINIC | Age: 59
End: 2024-04-09
Payer: MEDICARE

## 2024-04-09 ENCOUNTER — APPOINTMENT (OUTPATIENT)
Dept: RADIOLOGY | Facility: HOSPITAL | Age: 59
End: 2024-04-09

## 2024-04-09 ENCOUNTER — OUTPATIENT (OUTPATIENT)
Dept: OUTPATIENT SERVICES | Facility: HOSPITAL | Age: 59
LOS: 1 days | End: 2024-04-09
Payer: MEDICARE

## 2024-04-09 VITALS
BODY MASS INDEX: 35.93 KG/M2 | HEART RATE: 88 BPM | HEIGHT: 70 IN | TEMPERATURE: 97.5 F | WEIGHT: 251 LBS | SYSTOLIC BLOOD PRESSURE: 126 MMHG | DIASTOLIC BLOOD PRESSURE: 72 MMHG | OXYGEN SATURATION: 95 % | RESPIRATION RATE: 16 BRPM

## 2024-04-09 DIAGNOSIS — R05.9 COUGH, UNSPECIFIED: ICD-10-CM

## 2024-04-09 PROCEDURE — 99214 OFFICE O/P EST MOD 30 MIN: CPT

## 2024-04-09 PROCEDURE — 71046 X-RAY EXAM CHEST 2 VIEWS: CPT

## 2024-04-09 PROCEDURE — 71046 X-RAY EXAM CHEST 2 VIEWS: CPT | Mod: 26

## 2024-04-09 NOTE — PLAN
[FreeTextEntry1] : #HTN  -controlled -following w/ cardio, has upcoming apt in May  -continue current regimen of amlodipine 5 mg QD, HCTZ 12.5 mg QD, Metoprolol succinate 50 mg QD, valsartan 320 mg QD  -f/u BMP   #Cough   -long term -f/u Chest Xray   -follow-up in October for annual exam

## 2024-04-09 NOTE — HISTORY OF PRESENT ILLNESS
[de-identified] : 58 yo M PMH cardiomyopathy, HLD, HTN, PVCs s/p ablation, Vitamin D Def current smoker of cigars (few a day) presenting today for follow-up.  He is accompanied by his mom's care taker, Sherry.  Since our last visit, he has established care w/ urology, on tamsulosin now, doing well.   Sherry states that Ed (Carlyle's brother) is wondering if Carlyle can have a chest Xray - Sherry states he has always had a little bit of a cough in the evening - which they attribute to the cigars he smokes. Denies wheezing, SOB, history of cigarette use.   Carlyle states they also established care with Derm in [place] since our last visit.

## 2024-04-09 NOTE — ASSESSMENT
[FreeTextEntry1] : 58 yo M PMH cardiomyopathy, HLD, HTN, PVCs s/p ablation, Vitamin D Def current smoker of cigars (few a day) presenting today for follow-up.  none

## 2024-04-09 NOTE — REVIEW OF SYSTEMS
[Shortness Of Breath] : no shortness of breath [Wheezing] : no wheezing [Cough] : cough [Dyspnea on Exertion] : not dyspnea on exertion [Negative] : Constitutional

## 2024-04-11 LAB
ANION GAP SERPL CALC-SCNC: 15 MMOL/L
BUN SERPL-MCNC: 18 MG/DL
CALCIUM SERPL-MCNC: 9.8 MG/DL
CHLORIDE SERPL-SCNC: 98 MMOL/L
CO2 SERPL-SCNC: 24 MMOL/L
CREAT SERPL-MCNC: 0.64 MG/DL
EGFR: 108 ML/MIN/1.73M2
GLUCOSE SERPL-MCNC: 131 MG/DL
POTASSIUM SERPL-SCNC: 4.2 MMOL/L
SODIUM SERPL-SCNC: 137 MMOL/L

## 2024-04-24 ENCOUNTER — RX RENEWAL (OUTPATIENT)
Age: 59
End: 2024-04-24

## 2024-05-09 ENCOUNTER — APPOINTMENT (OUTPATIENT)
Dept: CARDIOLOGY | Facility: CLINIC | Age: 59
End: 2024-05-09

## 2024-05-09 ENCOUNTER — NON-APPOINTMENT (OUTPATIENT)
Age: 59
End: 2024-05-09

## 2024-05-10 ENCOUNTER — EMERGENCY (EMERGENCY)
Facility: HOSPITAL | Age: 59
LOS: 1 days | Discharge: HOME | End: 2024-05-10
Attending: INTERNAL MEDICINE | Admitting: FAMILY MEDICINE
Payer: MEDICARE

## 2024-05-10 VITALS
TEMPERATURE: 98 F | DIASTOLIC BLOOD PRESSURE: 86 MMHG | OXYGEN SATURATION: 95 % | SYSTOLIC BLOOD PRESSURE: 148 MMHG | RESPIRATION RATE: 20 BRPM | HEART RATE: 80 BPM | HEIGHT: 70 IN | WEIGHT: 250 LBS

## 2024-05-10 DIAGNOSIS — Z98.890 OTHER SPECIFIED POSTPROCEDURAL STATES: Chronic | ICD-10-CM

## 2024-05-10 LAB
ALBUMIN SERPL ELPH-MCNC: 3.6 G/DL — SIGNIFICANT CHANGE UP (ref 3.3–5)
ALP SERPL-CCNC: 64 U/L — SIGNIFICANT CHANGE UP (ref 40–120)
ALT FLD-CCNC: 46 U/L — HIGH (ref 10–45)
ANION GAP SERPL CALC-SCNC: 7 MMOL/L — SIGNIFICANT CHANGE UP (ref 5–17)
AST SERPL-CCNC: 34 U/L — SIGNIFICANT CHANGE UP (ref 10–40)
BASOPHILS # BLD AUTO: 0.02 K/UL — SIGNIFICANT CHANGE UP (ref 0–0.2)
BASOPHILS NFR BLD AUTO: 0.3 % — SIGNIFICANT CHANGE UP (ref 0–2)
BILIRUB SERPL-MCNC: 0.6 MG/DL — SIGNIFICANT CHANGE UP (ref 0.2–1.2)
BUN SERPL-MCNC: 22 MG/DL — SIGNIFICANT CHANGE UP (ref 7–23)
CALCIUM SERPL-MCNC: 8.9 MG/DL — SIGNIFICANT CHANGE UP (ref 8.4–10.5)
CHLORIDE SERPL-SCNC: 101 MMOL/L — SIGNIFICANT CHANGE UP (ref 96–108)
CO2 SERPL-SCNC: 31 MMOL/L — SIGNIFICANT CHANGE UP (ref 22–31)
CREAT SERPL-MCNC: 0.78 MG/DL — SIGNIFICANT CHANGE UP (ref 0.5–1.3)
EGFR: 102 ML/MIN/1.73M2 — SIGNIFICANT CHANGE UP
EOSINOPHIL # BLD AUTO: 0.05 K/UL — SIGNIFICANT CHANGE UP (ref 0–0.5)
EOSINOPHIL NFR BLD AUTO: 0.8 % — SIGNIFICANT CHANGE UP (ref 0–6)
GLUCOSE SERPL-MCNC: 107 MG/DL — HIGH (ref 70–99)
HCT VFR BLD CALC: 41.3 % — SIGNIFICANT CHANGE UP (ref 39–50)
HGB BLD-MCNC: 14.3 G/DL — SIGNIFICANT CHANGE UP (ref 13–17)
IMM GRANULOCYTES NFR BLD AUTO: 0.5 % — SIGNIFICANT CHANGE UP (ref 0–0.9)
LYMPHOCYTES # BLD AUTO: 0.78 K/UL — LOW (ref 1–3.3)
LYMPHOCYTES # BLD AUTO: 13 % — SIGNIFICANT CHANGE UP (ref 13–44)
MAGNESIUM SERPL-MCNC: 2.1 MG/DL — SIGNIFICANT CHANGE UP (ref 1.6–2.6)
MCHC RBC-ENTMCNC: 32.8 PG — SIGNIFICANT CHANGE UP (ref 27–34)
MCHC RBC-ENTMCNC: 34.6 GM/DL — SIGNIFICANT CHANGE UP (ref 32–36)
MCV RBC AUTO: 94.7 FL — SIGNIFICANT CHANGE UP (ref 80–100)
MONOCYTES # BLD AUTO: 0.37 K/UL — SIGNIFICANT CHANGE UP (ref 0–0.9)
MONOCYTES NFR BLD AUTO: 6.2 % — SIGNIFICANT CHANGE UP (ref 2–14)
NEUTROPHILS # BLD AUTO: 4.74 K/UL — SIGNIFICANT CHANGE UP (ref 1.8–7.4)
NEUTROPHILS NFR BLD AUTO: 79.2 % — HIGH (ref 43–77)
NRBC # BLD: 0 /100 WBCS — SIGNIFICANT CHANGE UP (ref 0–0)
NT-PROBNP SERPL-SCNC: 106 PG/ML — SIGNIFICANT CHANGE UP (ref 0–300)
PLATELET # BLD AUTO: 162 K/UL — SIGNIFICANT CHANGE UP (ref 150–400)
POTASSIUM SERPL-MCNC: 3.7 MMOL/L — SIGNIFICANT CHANGE UP (ref 3.5–5.3)
POTASSIUM SERPL-SCNC: 3.7 MMOL/L — SIGNIFICANT CHANGE UP (ref 3.5–5.3)
PROT SERPL-MCNC: 7.3 G/DL — SIGNIFICANT CHANGE UP (ref 6–8.3)
RAPID RVP RESULT: DETECTED
RBC # BLD: 4.36 M/UL — SIGNIFICANT CHANGE UP (ref 4.2–5.8)
RBC # FLD: 13.7 % — SIGNIFICANT CHANGE UP (ref 10.3–14.5)
RV+EV RNA SPEC QL NAA+PROBE: DETECTED
SARS-COV-2 RNA SPEC QL NAA+PROBE: SIGNIFICANT CHANGE UP
SODIUM SERPL-SCNC: 139 MMOL/L — SIGNIFICANT CHANGE UP (ref 135–145)
TROPONIN I, HIGH SENSITIVITY RESULT: 13.5 NG/L — SIGNIFICANT CHANGE UP
WBC # BLD: 5.99 K/UL — SIGNIFICANT CHANGE UP (ref 3.8–10.5)
WBC # FLD AUTO: 5.99 K/UL — SIGNIFICANT CHANGE UP (ref 3.8–10.5)

## 2024-05-10 PROCEDURE — 93010 ELECTROCARDIOGRAM REPORT: CPT

## 2024-05-10 PROCEDURE — 71045 X-RAY EXAM CHEST 1 VIEW: CPT | Mod: 26

## 2024-05-10 PROCEDURE — 99285 EMERGENCY DEPT VISIT HI MDM: CPT

## 2024-05-10 PROCEDURE — 93971 EXTREMITY STUDY: CPT | Mod: 26,RT

## 2024-05-10 PROCEDURE — 99223 1ST HOSP IP/OBS HIGH 75: CPT

## 2024-05-10 RX ORDER — HYDROCHLOROTHIAZIDE 25 MG
1 TABLET ORAL
Refills: 0 | DISCHARGE

## 2024-05-10 RX ORDER — DEXAMETHASONE 0.5 MG/5ML
4 ELIXIR ORAL ONCE
Refills: 0 | Status: COMPLETED | OUTPATIENT
Start: 2024-05-10 | End: 2024-05-10

## 2024-05-10 RX ORDER — IPRATROPIUM/ALBUTEROL SULFATE 18-103MCG
3 AEROSOL WITH ADAPTER (GRAM) INHALATION EVERY 6 HOURS
Refills: 0 | Status: DISCONTINUED | OUTPATIENT
Start: 2024-05-10 | End: 2024-05-15

## 2024-05-10 RX ORDER — METOPROLOL TARTRATE 50 MG
1 TABLET ORAL
Qty: 0 | Refills: 0 | DISCHARGE

## 2024-05-10 RX ORDER — IPRATROPIUM/ALBUTEROL SULFATE 18-103MCG
3 AEROSOL WITH ADAPTER (GRAM) INHALATION ONCE
Refills: 0 | Status: COMPLETED | OUTPATIENT
Start: 2024-05-10 | End: 2024-05-10

## 2024-05-10 RX ORDER — VALSARTAN 80 MG/1
320 TABLET ORAL DAILY
Refills: 0 | Status: DISCONTINUED | OUTPATIENT
Start: 2024-05-11 | End: 2024-05-15

## 2024-05-10 RX ORDER — TAMSULOSIN HYDROCHLORIDE 0.4 MG/1
0.4 CAPSULE ORAL AT BEDTIME
Refills: 0 | Status: DISCONTINUED | OUTPATIENT
Start: 2024-05-10 | End: 2024-05-15

## 2024-05-10 RX ORDER — AMLODIPINE BESYLATE 2.5 MG/1
1 TABLET ORAL
Refills: 0 | DISCHARGE

## 2024-05-10 RX ORDER — METOPROLOL TARTRATE 50 MG
50 TABLET ORAL
Refills: 0 | Status: DISCONTINUED | OUTPATIENT
Start: 2024-05-10 | End: 2024-05-15

## 2024-05-10 RX ORDER — AMLODIPINE BESYLATE 2.5 MG/1
5 TABLET ORAL DAILY
Refills: 0 | Status: DISCONTINUED | OUTPATIENT
Start: 2024-05-11 | End: 2024-05-15

## 2024-05-10 RX ORDER — ACETAMINOPHEN 500 MG
650 TABLET ORAL EVERY 6 HOURS
Refills: 0 | Status: DISCONTINUED | OUTPATIENT
Start: 2024-05-10 | End: 2024-05-15

## 2024-05-10 RX ORDER — ASPIRIN/CALCIUM CARB/MAGNESIUM 324 MG
81 TABLET ORAL DAILY
Refills: 0 | Status: DISCONTINUED | OUTPATIENT
Start: 2024-05-11 | End: 2024-05-15

## 2024-05-10 RX ORDER — TAMSULOSIN HYDROCHLORIDE 0.4 MG/1
1 CAPSULE ORAL
Refills: 0 | DISCHARGE

## 2024-05-10 RX ORDER — ONDANSETRON 8 MG/1
4 TABLET, FILM COATED ORAL EVERY 8 HOURS
Refills: 0 | Status: DISCONTINUED | OUTPATIENT
Start: 2024-05-10 | End: 2024-05-15

## 2024-05-10 RX ORDER — LANOLIN ALCOHOL/MO/W.PET/CERES
3 CREAM (GRAM) TOPICAL AT BEDTIME
Refills: 0 | Status: DISCONTINUED | OUTPATIENT
Start: 2024-05-10 | End: 2024-05-15

## 2024-05-10 RX ORDER — BUDESONIDE AND FORMOTEROL FUMARATE DIHYDRATE 160; 4.5 UG/1; UG/1
2 AEROSOL RESPIRATORY (INHALATION) EVERY 12 HOURS
Refills: 0 | Status: DISCONTINUED | OUTPATIENT
Start: 2024-05-11 | End: 2024-05-15

## 2024-05-10 RX ORDER — VALSARTAN 80 MG/1
1 TABLET ORAL
Refills: 0 | DISCHARGE

## 2024-05-10 RX ORDER — IPRATROPIUM/ALBUTEROL SULFATE 18-103MCG
3 AEROSOL WITH ADAPTER (GRAM) INHALATION
Refills: 0 | Status: COMPLETED | OUTPATIENT
Start: 2024-05-10 | End: 2024-05-10

## 2024-05-10 RX ORDER — DEXAMETHASONE 0.5 MG/5ML
6 ELIXIR ORAL ONCE
Refills: 0 | Status: COMPLETED | OUTPATIENT
Start: 2024-05-10 | End: 2024-05-10

## 2024-05-10 RX ORDER — ASPIRIN/CALCIUM CARB/MAGNESIUM 324 MG
1 TABLET ORAL
Qty: 0 | Refills: 0 | DISCHARGE

## 2024-05-10 RX ORDER — ENOXAPARIN SODIUM 100 MG/ML
40 INJECTION SUBCUTANEOUS EVERY 24 HOURS
Refills: 0 | Status: DISCONTINUED | OUTPATIENT
Start: 2024-05-10 | End: 2024-05-15

## 2024-05-10 RX ORDER — BUDESONIDE, MICRONIZED 100 %
0.5 POWDER (GRAM) MISCELLANEOUS ONCE
Refills: 0 | Status: COMPLETED | OUTPATIENT
Start: 2024-05-10 | End: 2024-05-10

## 2024-05-10 RX ORDER — LIDOCAINE HCL 20 MG/ML
10 VIAL (ML) INJECTION ONCE
Refills: 0 | Status: COMPLETED | OUTPATIENT
Start: 2024-05-10 | End: 2024-05-10

## 2024-05-10 RX ORDER — VALSARTAN 80 MG/1
1 TABLET ORAL
Qty: 0 | Refills: 0 | DISCHARGE

## 2024-05-10 RX ADMIN — Medication 100 MILLIGRAM(S): at 19:20

## 2024-05-10 RX ADMIN — Medication 6 MILLIGRAM(S): at 19:19

## 2024-05-10 RX ADMIN — Medication 0.5 MILLIGRAM(S): at 17:42

## 2024-05-10 RX ADMIN — Medication 3 MILLILITER(S): at 21:23

## 2024-05-10 RX ADMIN — Medication 3 MILLILITER(S): at 19:19

## 2024-05-10 RX ADMIN — Medication 3 MILLILITER(S): at 16:28

## 2024-05-10 RX ADMIN — TAMSULOSIN HYDROCHLORIDE 0.4 MILLIGRAM(S): 0.4 CAPSULE ORAL at 23:16

## 2024-05-10 RX ADMIN — Medication 4 MILLIGRAM(S): at 21:23

## 2024-05-10 RX ADMIN — Medication 10 MILLILITER(S): at 18:58

## 2024-05-10 RX ADMIN — Medication 3 MILLILITER(S): at 17:09

## 2024-05-10 NOTE — H&P ADULT - HISTORY OF PRESENT ILLNESS
59-year-old male Past history of hypertension dyslipidemia morbidly obese came to the emergency room chief complaint of cough going on for approximately a week progressively getting worse shortness of breath patient also has right leg swelling which has been there for like a long time more than 6 months according to the patient's become worse Patient has been coughing yellow and greenish phlegm no nasal congestion no fever and no chest pain no abdominal pain Mr Diamond is a 59-year-old male with HTN, BPH, Obesity, hx of SVT, s/p ablation, presents to the ED, c/o cough for about 10 days, but for the past few days, has been having wheezing, increased dyspnea. Pt was just taking Mucinex but not improved, went to the urgent care today, noted to have O2 sat 90-91% on room air, was told to go to the ED.  Pt denies chest pain, nausea, vomiting, diarrhea, abd pain, fever, nor chills.  Pt states that lately cough has been productive - greenish phlegm,    He denies hx of asthma nor COPD.  He smokes a cigar 3x a week, drinks wine or a beer with lunch/dinner.  Pt has chronic PVD of right leg, chronic swelling.  Doppler of right leg neg for DVT.   Cxray neg for infiltrates.  Nasal swab +ve for entero/rhinovirus.  Mr Diamond is a 59-year-old male with HTN, BPH, Obesity, hx of SVT, s/p ablation, presents to the ED, c/o cough for about 10 days, but for the past few days, has been having wheezing, increased dyspnea. Pt was just taking Mucinex but not improved, went to the urgent care today, noted to have O2 sat 90-91% on room air, was told to go to the ED.  Pt denies chest pain, nausea, vomiting, diarrhea, abd pain, fever, nor chills.  Pt states that lately cough has been occasionally productive - light greenish phlegm,    He denies hx of asthma nor COPD.  He smokes a cigar 3x a week, drinks wine or a beer with lunch/dinner.  Pt has chronic PVD of right leg, chronic swelling.  Doppler of right leg neg for DVT.   Cxray neg for infiltrates.  Nasal swab +ve for entero/rhinovirus.     Pt received Decadron IV x 1 6 mg, Albuterol neb x 1,  budesonide tx x 1 in the ED.  O2 sat 91% on room air at rest.

## 2024-05-10 NOTE — H&P ADULT - ASSESSMENT
Acute bronchitis with bronchospasm  +Entero/rhinovirus  Hypertension  BPH  Hx of SVT-hx of ablation    Place in Observation  Cont O2 2 LPM via NC-to keep O2 sat >92%  Short course of steroids - prednisone 40 mg/day x 5 days  Bronchodilators- Duoneb every 6 hours  Will start Symbicort 2 puff twice daily (80/4.5)  Mucinex  Cont other meds: Aspirin, Metoprolol succ, Valsartan, Tamsulosin  Monitor O2 sats - hopefully, can be off O2 by tomorrow, s/p above respiratory/medical intervention  DVT prophylaxi     Acute bronchitis with bronchospasm  +Entero/rhinovirus  Hypertension  BPH  Hx of SVT-hx of ablation    Place in Observation  Cont O2 2 LPM via NC-to keep O2 sat >92%  Short course of steroids - prednisone 40 mg/day x 5 days  Bronchodilators- Duoneb every 6 hours  Will start Symbicort 2 puff twice daily (80/4.5)  Mucinex  Cont other meds: Aspirin, Metoprolol succ, Valsartan, Tamsulosin  Monitor O2 sats - hopefully, can be off O2 by tomorrow, s/p above respiratory/medical intervention  DVT prophylaxis

## 2024-05-10 NOTE — ED PROVIDER NOTE - PHYSICAL EXAMINATION
General:     NAD, well-nourished, well-appearing  Head:     NC/AT, EOMI, oral mucosa moist  Neck:     trachea midline  Lungs:     Wheezing bilaterally Decreased air entry on the right lung  CVS:     S1S2, RRR, no m/g/r  Abd:     +BS, s/nt/nd, no organomegaly  Ext:    2+ radial and pedal pulses, no c/c/e  Neuro: AAOx3, no sensory/motor deficits

## 2024-05-10 NOTE — ED PROVIDER NOTE - OBJECTIVE STATEMENT
59-year-old male Past history of hypertension dyslipidemia morbidly obese came to the emergency room chief complaint of cough going on for approximately a week progressively getting worse shortness of breath patient also has right leg swelling which has been there for like a long time more than 6 months according to the patient's become worse Patient has been coughing yellow and greenish phlegm no nasal congestion no fever and no chest pain no abdominal pain

## 2024-05-10 NOTE — ED PROVIDER NOTE - CLINICAL SUMMARY MEDICAL DECISION MAKING FREE TEXT BOX
59-year-old male Past history of hypertension dyslipidemia morbidly obese came to the emergency room chief complaint of cough going on for approximately a week progressively getting worse shortness of breath patient also has right leg swelling which has been there for like a long time more than 6 months according to the patient's become worse Patient has been coughing yellow and greenish phlegm no nasal congestion no fever and no chest pain no abdominal pain 59-year-old male Past history of hypertension dyslipidemia morbidly obese came to the emergency room chief complaint of cough going on for approximately a week progressively getting worse shortness of breath patient also has right leg swelling which has been there for like a long time more than 6 months according to the patient's become worse Patient has been coughing yellow and greenish phlegm no nasal congestion no fever and no chest pain no abdominal pain  Treated with multiple DuoNebs steroid inhaler and Decadron chest x-ray negative labs within normal RVP pending patient continues to be hypoxic after the treatment tolerating 4 L nasal cannula with saturation of 96%

## 2024-05-10 NOTE — ED ADULT NURSE NOTE - OBJECTIVE STATEMENT
Pt axo3, BIBA from  with hypoxia and cough x 10 days. Pt's O2 was 91% on RA. Received 1 albuterol in UC. pt states cough is productive, also states he smokes cigars weekly. denies chest pain or fevers. safety maintained.

## 2024-05-10 NOTE — ED ADULT NURSE NOTE - NSFALLUNIVINTERV_ED_ALL_ED
Bed/Stretcher in lowest position, wheels locked, appropriate side rails in place/Call bell, personal items and telephone in reach/Instruct patient to call for assistance before getting out of bed/chair/stretcher/Non-slip footwear applied when patient is off stretcher/[place] to call system/Physically safe environment - no spills, clutter or unnecessary equipment/Purposeful proactive rounding/Room/bathroom lighting operational, light cord in reach

## 2024-05-10 NOTE — H&P ADULT - NSHPPHYSICALEXAM_GEN_ALL_CORE
Vital Signs (24 Hrs):  T(C): 36.7 (05-10-24 @ 21:59), Max: 36.9 (05-10-24 @ 15:19)  HR: 84 (05-10-24 @ :59) (77 - 90)  BP: 154/76 (05-10-24 @ 21:59) (117/70 - 154/82)  RR: 18 (05-10-24 @ 21:59) (18 - 20)  SpO2: 94% (05-10-24 @ 21:59) (94% - 96%)  Wt(kg): --  Daily Height in cm: 177.8 (10 May 2024 21:59)    Daily Weight in k.4 (10 May 2024 21:59)    I&O's Summary

## 2024-05-11 PROCEDURE — 99236 HOSP IP/OBS SAME DATE HI 85: CPT | Mod: GC

## 2024-05-11 RX ADMIN — BUDESONIDE AND FORMOTEROL FUMARATE DIHYDRATE 2 PUFF(S): 160; 4.5 AEROSOL RESPIRATORY (INHALATION) at 21:34

## 2024-05-11 RX ADMIN — Medication 3 MILLILITER(S): at 15:31

## 2024-05-11 RX ADMIN — Medication 81 MILLIGRAM(S): at 11:58

## 2024-05-11 RX ADMIN — Medication 3 MILLILITER(S): at 09:03

## 2024-05-11 RX ADMIN — VALSARTAN 320 MILLIGRAM(S): 80 TABLET ORAL at 06:00

## 2024-05-11 RX ADMIN — Medication 50 MILLIGRAM(S): at 17:50

## 2024-05-11 RX ADMIN — Medication 3 MILLILITER(S): at 21:34

## 2024-05-11 RX ADMIN — AMLODIPINE BESYLATE 5 MILLIGRAM(S): 2.5 TABLET ORAL at 06:00

## 2024-05-11 RX ADMIN — Medication 50 MILLIGRAM(S): at 06:00

## 2024-05-11 RX ADMIN — ENOXAPARIN SODIUM 40 MILLIGRAM(S): 100 INJECTION SUBCUTANEOUS at 06:00

## 2024-05-11 RX ADMIN — BUDESONIDE AND FORMOTEROL FUMARATE DIHYDRATE 2 PUFF(S): 160; 4.5 AEROSOL RESPIRATORY (INHALATION) at 09:10

## 2024-05-11 RX ADMIN — Medication 3 MILLILITER(S): at 05:17

## 2024-05-11 RX ADMIN — TAMSULOSIN HYDROCHLORIDE 0.4 MILLIGRAM(S): 0.4 CAPSULE ORAL at 22:06

## 2024-05-11 NOTE — PROGRESS NOTE ADULT - ASSESSMENT
Mr Diamond is a 59-year-old male with HTN, BPH, Obesity, hx of SVT, s/p ablation, admitted for acute bronchitis.        #acute bronchitis 2/2 entero/rhinovirus   - in OBS  - satting 95% on 3L NC   - continue prednisone 40mg for total of 5 days   - continue duoneb q6hrs  - continue symicort 2 puff BID   - mucinex       HTN  -continue metoprolol, valsartan     BPH  - continue tamsulosin     Vte ppx: lovenox     Mr Weller is a 59-year-old male with HTN, BPH, Obesity, hx of SVT, s/p ablation, admitted for acute bronchitis.        #acute bronchitis 2/2 entero/rhinovirus   - admitted under OBS  - satting 95% on 3L NC ; titrated down to 2L at 90%   - continue prednisone 40mg for total of 5 days   - continue duoneb q6hrs  - continue symbicort 2 puff BID   - continue mucinex   - body habitus may play a significant role in hypoxia; advised patient to be evaluated by pulmonologist for YAZ   - ABG in the AM   - monitor for improvement in saturation; likely dc tomorrow     #HTN  -continue metoprolol, amlodipine, valsartan, HCTZ    #BPH  - continue tamsulosin     Vte ppx: lovenox      Discussed with Dr. Chavarria    Mr Weller is a 59-year-old male with HTN, BPH, Obesity, hx of SVT, s/p ablation, admitted for acute bronchitis.        #acute bronchitis 2/2 entero/rhinovirus   - admitted under OBS  - satting 95% on 3L NC ; titrated down to 2L at 90%   - continue prednisone 40mg for total of 5 days   - continue duoneb q6hrs  - continue symbicort 2 puff BID   - continue mucinex   - body habitus may play a significant role in hypoxia; advised patient to be evaluated by outpt pulmonologist for YAZ   - ABG in the AM   - monitor for improvement in saturation; likely dc tomorrow     #HTN  -continue metoprolol, amlodipine, valsartan, HCTZ    #BPH  - continue tamsulosin     Vte ppx: lovenox      Discussed with Dr. Chavarria

## 2024-05-11 NOTE — PROGRESS NOTE ADULT - SUBJECTIVE AND OBJECTIVE BOX
HPI: Mr Diamond is a 59-year-old male with HTN, BPH, Obesity, hx of SVT, s/p ablation, presents to the ED, c/o cough for about 10 days, but for the past few days, has been having wheezing, increased dyspnea.     INTERVAL HPI/OVERNIGHT EVENTS: Patient seen and examined at bedside. No overnight events.      Allergies    No Known Allergies    Intolerances        REVIEW OF SYSTEMS:  CONSTITUTIONAL: No fever or chills  HEENT:  No headache, no sore throat  RESPIRATORY: No cough, wheezing, or shortness of breath  CARDIOVASCULAR: No chest pain, palpitations  GASTROINTESTINAL: No abd pain, nausea, vomiting, or diarrhea  GENITOURINARY: No dysuria, frequency, or hematuria  NEUROLOGICAL: no focal weakness or dizziness  MUSCULOSKELETAL: no myalgias     Vital Signs Last 24 Hrs  T(C): 37 (11 May 2024 05:07), Max: 37 (11 May 2024 05:07)  T(F): 98.6 (11 May 2024 05:07), Max: 98.6 (11 May 2024 05:07)  HR: 94 (11 May 2024 05:17) (77 - 98)  BP: 129/70 (11 May 2024 05:45) (117/67 - 154/82)  BP(mean): 89 (11 May 2024 05:45) (84 - 89)  RR: 18 (11 May 2024 05:07) (18 - 20)  SpO2: 98% (11 May 2024 05:17) (94% - 98%)    Parameters below as of 11 May 2024 05:07  Patient On (Oxygen Delivery Method): nasal cannula  O2 Flow (L/min): 3    I&O's Summary    BMI (kg/m2): 35.9 (05-10-24 @ 21:59)    PHYSICAL EXAM:  GENERAL: NAD  HEENT:  AT/NC, moist mucous membranes, EOMI, conjunctiva and sclera clear  CHEST/LUNG:  CTA b/l, no rales, wheezes, or rhonchi,  normal respiratory effort, no intercostal retractions  HEART:  RRR, S1, S2, no murmurs; no pitting edema  ABDOMEN:  BS+, soft, nontender, nondistended; No HSM  MSK/EXTREMITIES: 2+ peripheral pulses, no clubbing or cyanosis  NERVOUS SYSTEM: answers questions and follows commands appropriately, A&Ox3 grossly moves all extremities   PSYCH: Appropriate affect, Alert & Awake; Good judgement      LABS: Personally reviewed  CBC                        14.3   5.99  )-----------( 162      ( 10 May 2024 16:30 )             41.3     CMP  05-10    139  |  101  |  22  ----------------------------<  107  3.7   |  31  |  0.78    Ca    8.9      10 May 2024 16:30  Mg     2.1     05-10    TPro  7.3  /  Alb  3.6  /  TBili  0.6  /  DBili  x   /  AST  34  /  ALT  46  /  AlkPhos  64  05-10                CARDIAC MARKERS ( 10 May 2024 16:30 )  x     / 13.5 ng/L / x     / x     / x                            Urinalysis Basic - ( 10 May 2024 16:30 )    Color: x / Appearance: x / SG: x / pH: x  Gluc: 107 mg/dL / Ketone: x  / Bili: x / Urobili: x   Blood: x / Protein: x / Nitrite: x   Leuk Esterase: x / RBC: x / WBC x   Sq Epi: x / Non Sq Epi: x / Bacteria: x                RADIOLOGY & ADDITIONAL TESTS: Personally reviewed.     Consultant(s) Notes Reviewed:  [x] YES  [ ] NO   Discussed with SW/CM, RN    MEDICATIONS  (STANDING):  albuterol/ipratropium for Nebulization 3 milliLiter(s) Nebulizer every 6 hours  amLODIPine   Tablet 5 milliGRAM(s) Oral daily  aspirin enteric coated 81 milliGRAM(s) Oral daily  budesonide  80 MICROgram(s)/formoterol 4.5 MICROgram(s) Inhaler 2 Puff(s) Inhalation every 12 hours  enoxaparin Injectable 40 milliGRAM(s) SubCutaneous every 24 hours  hydrochlorothiazide 12.5 milliGRAM(s) Oral daily  metoprolol succinate ER 50 milliGRAM(s) Oral two times a day  predniSONE   Tablet 40 milliGRAM(s) Oral daily  tamsulosin 0.4 milliGRAM(s) Oral at bedtime  valsartan 320 milliGRAM(s) Oral daily    MEDICATIONS  (PRN):  acetaminophen     Tablet .. 650 milliGRAM(s) Oral every 6 hours PRN Temp greater or equal to 38C (100.4F), Mild Pain (1 - 3)  aluminum hydroxide/magnesium hydroxide/simethicone Suspension 30 milliLiter(s) Oral every 4 hours PRN Dyspepsia  melatonin 3 milliGRAM(s) Oral at bedtime PRN Insomnia  ondansetron Injectable 4 milliGRAM(s) IV Push every 8 hours PRN Nausea and/or Vomiting         HPI: Mr Diamond is a 59-year-old male with HTN, BPH, Obesity, hx of SVT, s/p ablation, presents to the ED, c/o cough for about 10 days, but for the past few days, has been having wheezing, increased dyspnea.     INTERVAL HPI/OVERNIGHT EVENTS: Patient seen and examined at bedside. No overnight events. Reports he is feeling much improved with the treatment he has been receiving       REVIEW OF SYSTEMS:  CONSTITUTIONAL: No fever or chills  HEENT:  No headache, no sore throat  RESPIRATORY: No cough, wheezing, or shortness of breath  CARDIOVASCULAR: No chest pain, palpitations  GASTROINTESTINAL: No abd pain, nausea, vomiting, or diarrhea  GENITOURINARY: No dysuria, frequency, or hematuria  NEUROLOGICAL: no focal weakness or dizziness  MUSCULOSKELETAL: no myalgias     Vital Signs Last 24 Hrs  T(C): 37 (11 May 2024 05:07), Max: 37 (11 May 2024 05:07)  T(F): 98.6 (11 May 2024 05:07), Max: 98.6 (11 May 2024 05:07)  HR: 94 (11 May 2024 05:17) (77 - 98)  BP: 129/70 (11 May 2024 05:45) (117/67 - 154/82)  BP(mean): 89 (11 May 2024 05:45) (84 - 89)  RR: 18 (11 May 2024 05:07) (18 - 20)  SpO2: 98% (11 May 2024 05:17) (94% - 98%)    Parameters below as of 11 May 2024 05:07  Patient On (Oxygen Delivery Method): nasal cannula  O2 Flow (L/min): 3      PHYSICAL EXAM:  GENERAL: morbidly obese   HEENT:  AT/NC, moist mucous membranes, EOMI, conjunctiva and sclera clear  CHEST/LUNG:  CTA b/l, no rales, wheezes, or rhonchi,  normal respiratory effort, no intercostal retractions  HEART:  RRR, S1, S2, no murmurs; no pitting edema  ABDOMEN:  BS+, soft, nontender, nondistended; No HSM  MSK/EXTREMITIES: 2+ peripheral pulses, no clubbing or cyanosis  NERVOUS SYSTEM: answers questions and follows commands appropriately, A&Ox3 grossly moves all extremities   PSYCH: Appropriate affect, Alert & Awake; Good judgement      LABS: Personally reviewed  CBC                                              14.3   5.99  )-----------( 162      ( 10 May 2024 16:30 )             41.3     05-10    139  |  101  |  22  ----------------------------<  107<H>  3.7   |  31  |  0.78    Ca    8.9      10 May 2024 16:30  Mg     2.1     05-10    TPro  7.3  /  Alb  3.6  /  TBili  0.6  /  DBili  x   /  AST  34  /  ALT  46<H>  /  AlkPhos  64  05-10          CARDIAC MARKERS ( 10 May 2024 16:30 )  x     / 13.5 ng/L / x     / x     / x          Urinalysis Basic - ( 10 May 2024 16:30 )    Color: x / Appearance: x / SG: x / pH: x  Gluc: 107 mg/dL / Ketone: x  / Bili: x / Urobili: x   Blood: x / Protein: x / Nitrite: x   Leuk Esterase: x / RBC: x / WBC x   Sq Epi: x / Non Sq Epi: x / Bacteria: x      IMAGING  RADIOLOGY & ADDITIONAL TESTS: Personally reviewed.   < from: Xray Chest 1 View- PORTABLE-Urgent (05.10.24 @ 16:57) >    COMPARISON: 4/9/2024 chest x-ray.  CLINICAL INFORMATION: Shortness of breath.    FINDINGS:  CATHETERS AND TUBES: None    PULMONARY:  There are no airspace consolidations or radiographic evidence of   pulmonary nodules..  No pleural effusion or pneumothorax.    HEART/VASCULAR:  The  heart is mildly enlarged in transverse diameter. No hilar mass. .  BONES: The visualized osseous thorax is intact.    IMPRESSION:  No radiographic evidence of active chest disease..  < end of copied text >      Medication  MEDICATIONS  (STANDING):  albuterol/ipratropium for Nebulization 3 milliLiter(s) Nebulizer every 6 hours  amLODIPine   Tablet 5 milliGRAM(s) Oral daily  aspirin enteric coated 81 milliGRAM(s) Oral daily  budesonide  80 MICROgram(s)/formoterol 4.5 MICROgram(s) Inhaler 2 Puff(s) Inhalation every 12 hours  enoxaparin Injectable 40 milliGRAM(s) SubCutaneous every 24 hours  hydrochlorothiazide 12.5 milliGRAM(s) Oral daily  metoprolol succinate ER 50 milliGRAM(s) Oral two times a day  predniSONE   Tablet 40 milliGRAM(s) Oral daily  tamsulosin 0.4 milliGRAM(s) Oral at bedtime  valsartan 320 milliGRAM(s) Oral daily    MEDICATIONS  (PRN):  acetaminophen     Tablet .. 650 milliGRAM(s) Oral every 6 hours PRN Temp greater or equal to 38C (100.4F), Mild Pain (1 - 3)  aluminum hydroxide/magnesium hydroxide/simethicone Suspension 30 milliLiter(s) Oral every 4 hours PRN Dyspepsia  melatonin 3 milliGRAM(s) Oral at bedtime PRN Insomnia  ondansetron Injectable 4 milliGRAM(s) IV Push every 8 hours PRN Nausea and/or Vomiting

## 2024-05-11 NOTE — PROGRESS NOTE ADULT - ATTENDING COMMENTS
59-year-old male with HTN, BPH, Obesity, hx of SVT, s/p ablation, admitted for acute bronchitis with acute respiratory failure with hypoxia Plan: attempted to wean but pt desaturated to 88% at rest, wean off O2 and check ambulatory O2 sats prior to dc, pt needs sleep study poss component of OHS, check abg, monitor clinical course, poss dc tomorrow

## 2024-05-12 ENCOUNTER — TRANSCRIPTION ENCOUNTER (OUTPATIENT)
Age: 59
End: 2024-05-12

## 2024-05-12 VITALS — OXYGEN SATURATION: 92 %

## 2024-05-12 LAB
ALBUMIN SERPL ELPH-MCNC: 3.4 G/DL — SIGNIFICANT CHANGE UP (ref 3.3–5)
ALP SERPL-CCNC: 59 U/L — SIGNIFICANT CHANGE UP (ref 40–120)
ALT FLD-CCNC: 46 U/L — HIGH (ref 10–45)
ANION GAP SERPL CALC-SCNC: 5 MMOL/L — SIGNIFICANT CHANGE UP (ref 5–17)
AST SERPL-CCNC: 36 U/L — SIGNIFICANT CHANGE UP (ref 10–40)
BILIRUB SERPL-MCNC: 0.5 MG/DL — SIGNIFICANT CHANGE UP (ref 0.2–1.2)
BUN SERPL-MCNC: 18 MG/DL — SIGNIFICANT CHANGE UP (ref 7–23)
CALCIUM SERPL-MCNC: 8.8 MG/DL — SIGNIFICANT CHANGE UP (ref 8.4–10.5)
CHLORIDE SERPL-SCNC: 104 MMOL/L — SIGNIFICANT CHANGE UP (ref 96–108)
CO2 SERPL-SCNC: 32 MMOL/L — HIGH (ref 22–31)
CREAT SERPL-MCNC: 0.81 MG/DL — SIGNIFICANT CHANGE UP (ref 0.5–1.3)
EGFR: 101 ML/MIN/1.73M2 — SIGNIFICANT CHANGE UP
GLUCOSE SERPL-MCNC: 159 MG/DL — HIGH (ref 70–99)
HCT VFR BLD CALC: 41.5 % — SIGNIFICANT CHANGE UP (ref 39–50)
HGB BLD-MCNC: 14.3 G/DL — SIGNIFICANT CHANGE UP (ref 13–17)
MCHC RBC-ENTMCNC: 32.8 PG — SIGNIFICANT CHANGE UP (ref 27–34)
MCHC RBC-ENTMCNC: 34.5 GM/DL — SIGNIFICANT CHANGE UP (ref 32–36)
MCV RBC AUTO: 95.2 FL — SIGNIFICANT CHANGE UP (ref 80–100)
NRBC # BLD: 0 /100 WBCS — SIGNIFICANT CHANGE UP (ref 0–0)
PLATELET # BLD AUTO: 183 K/UL — SIGNIFICANT CHANGE UP (ref 150–400)
POTASSIUM SERPL-MCNC: 4 MMOL/L — SIGNIFICANT CHANGE UP (ref 3.5–5.3)
POTASSIUM SERPL-SCNC: 4 MMOL/L — SIGNIFICANT CHANGE UP (ref 3.5–5.3)
PROT SERPL-MCNC: 7.2 G/DL — SIGNIFICANT CHANGE UP (ref 6–8.3)
RBC # BLD: 4.36 M/UL — SIGNIFICANT CHANGE UP (ref 4.2–5.8)
RBC # FLD: 14.2 % — SIGNIFICANT CHANGE UP (ref 10.3–14.5)
SODIUM SERPL-SCNC: 141 MMOL/L — SIGNIFICANT CHANGE UP (ref 135–145)
WBC # BLD: 7.12 K/UL — SIGNIFICANT CHANGE UP (ref 3.8–10.5)
WBC # FLD AUTO: 7.12 K/UL — SIGNIFICANT CHANGE UP (ref 3.8–10.5)

## 2024-05-12 PROCEDURE — 99239 HOSP IP/OBS DSCHRG MGMT >30: CPT | Mod: GC

## 2024-05-12 PROCEDURE — 0225U NFCT DS DNA&RNA 21 SARSCOV2: CPT

## 2024-05-12 PROCEDURE — 71045 X-RAY EXAM CHEST 1 VIEW: CPT

## 2024-05-12 PROCEDURE — 85027 COMPLETE CBC AUTOMATED: CPT

## 2024-05-12 PROCEDURE — 80053 COMPREHEN METABOLIC PANEL: CPT

## 2024-05-12 PROCEDURE — 96375 TX/PRO/DX INJ NEW DRUG ADDON: CPT

## 2024-05-12 PROCEDURE — 83880 ASSAY OF NATRIURETIC PEPTIDE: CPT

## 2024-05-12 PROCEDURE — 84484 ASSAY OF TROPONIN QUANT: CPT

## 2024-05-12 PROCEDURE — 93005 ELECTROCARDIOGRAM TRACING: CPT

## 2024-05-12 PROCEDURE — 99285 EMERGENCY DEPT VISIT HI MDM: CPT | Mod: 25

## 2024-05-12 PROCEDURE — 94760 N-INVAS EAR/PLS OXIMETRY 1: CPT

## 2024-05-12 PROCEDURE — 96376 TX/PRO/DX INJ SAME DRUG ADON: CPT

## 2024-05-12 PROCEDURE — 94640 AIRWAY INHALATION TREATMENT: CPT

## 2024-05-12 PROCEDURE — G0378: CPT

## 2024-05-12 PROCEDURE — 94664 DEMO&/EVAL PT USE INHALER: CPT

## 2024-05-12 PROCEDURE — 85025 COMPLETE CBC W/AUTO DIFF WBC: CPT

## 2024-05-12 PROCEDURE — 96374 THER/PROPH/DIAG INJ IV PUSH: CPT

## 2024-05-12 PROCEDURE — 93971 EXTREMITY STUDY: CPT

## 2024-05-12 PROCEDURE — 83735 ASSAY OF MAGNESIUM: CPT

## 2024-05-12 PROCEDURE — 36415 COLL VENOUS BLD VENIPUNCTURE: CPT

## 2024-05-12 RX ORDER — BUDESONIDE AND FORMOTEROL FUMARATE DIHYDRATE 160; 4.5 UG/1; UG/1
2 AEROSOL RESPIRATORY (INHALATION)
Qty: 1 | Refills: 0
Start: 2024-05-12

## 2024-05-12 RX ADMIN — Medication 50 MILLIGRAM(S): at 06:15

## 2024-05-12 RX ADMIN — AMLODIPINE BESYLATE 5 MILLIGRAM(S): 2.5 TABLET ORAL at 06:15

## 2024-05-12 RX ADMIN — Medication 3 MILLILITER(S): at 09:56

## 2024-05-12 RX ADMIN — VALSARTAN 320 MILLIGRAM(S): 80 TABLET ORAL at 06:15

## 2024-05-12 RX ADMIN — Medication 3 MILLILITER(S): at 15:07

## 2024-05-12 RX ADMIN — Medication 40 MILLIGRAM(S): at 06:15

## 2024-05-12 RX ADMIN — ENOXAPARIN SODIUM 40 MILLIGRAM(S): 100 INJECTION SUBCUTANEOUS at 06:16

## 2024-05-12 RX ADMIN — BUDESONIDE AND FORMOTEROL FUMARATE DIHYDRATE 2 PUFF(S): 160; 4.5 AEROSOL RESPIRATORY (INHALATION) at 09:56

## 2024-05-12 RX ADMIN — Medication 81 MILLIGRAM(S): at 12:07

## 2024-05-12 NOTE — DISCHARGE NOTE NURSING/CASE MANAGEMENT/SOCIAL WORK - NSDCPEWEB_GEN_ALL_CORE
[facility] for Tobacco Control website --- [email or web address] --- [email or web address]

## 2024-05-12 NOTE — DISCHARGE NOTE PROVIDER - ATTENDING DISCHARGE PHYSICAL EXAMINATION:
GENERAL: NAD, lying in bed comfortably, obese  HEAD:  Atraumatic, normocephalic  EYES: EOMI, conjunctiva and sclera clear  NECK: Supple, trachea midline, no JVD  HEART: Regular rate and rhythm, no murmurs, rubs, or gallops  LUNGS: Unlabored respirations.  Clear to auscultation bilaterally, no crackles, wheezing, or rhonchi  ABDOMEN: Soft, nontender, nondistended, +BS  EXTREMITIES: 2+ peripheral pulses bilaterally. No clubbing, cyanosis, or edema  NERVOUS SYSTEM:  A&Ox3, moving all extremities, no focal deficits   SKIN: No rashes or lesions

## 2024-05-12 NOTE — PROGRESS NOTE ADULT - ASSESSMENT
Patient is a 59-year-old male with HTN, BPH, Obesity, hx of SVT, s/p ablation, presents to the ED, c/o cough for about 10 days, but for the past few days, has been having wheezing, increased dyspnea, admitted for acute viral bronchitis w/ bronchospasm.    #acute bronchitis 2/2 entero/rhinovirus   - admitted under OBS  - satting 95% on 3L NC ; titrated down to 2L at 90%   - continue prednisone 40mg for total of 5 days   - continue duoneb q6hrs  - continue symbicort 2 puff BID   - continue mucinex   - body habitus may play a significant role in hypoxia; advised patient to be evaluated by pulmonologist for YAZ   - ABG in the AM   - monitor for improvement in saturation; likely dc tomorrow     #HTN  -continue metoprolol, amlodipine, valsartan, HCTZ    #BPH  - continue tamsulosin     #DVT ppx: lovenox      Discussed with attending, Dr. Chavarria    Patient is a 59-year-old male with HTN, BPH, Obesity, hx of SVT, s/p ablation, presents to the ED, c/o cough for about 10 days, but for the past few days, has been having wheezing, increased dyspnea, admitted for acute viral bronchitis w/ bronchospasm.    #Acute bronchitis 2/2 entero/rhinovirus   #Active cigar smoker  - Admitted under OBS  - Satting 95% on 3L NC, will try to titrate down today, get ambulating sats  - Continue prednisone 40mg for total of 5 days   - Continue duoneb q6hrs  - Continue symbicort 2 puff BID   - Continue mucinex   - Body habitus may play a significant role in hypoxia; advised patient to be evaluated by pulmonologist for YAZ   - Smokes 1-2 cigars socially a week, discussed risks of smoking, discussed cessation options, no smoking cessation desired at this time     #HTN  - Continue metoprolol, amlodipine, valsartan, HCTZ  - Monitor vitals     #BPH  - Continue tamsulosin     #DVT ppx: lovenox      Discussed with attending, Dr. Chavarria

## 2024-05-12 NOTE — PROGRESS NOTE ADULT - SUBJECTIVE AND OBJECTIVE BOX
Patient is a 59-year-old male with HTN, BPH, Obesity, hx of SVT, s/p ablation, presents to the ED, c/o cough for about 10 days, but for the past few days, has been having wheezing, increased dyspnea, admitted for acute viral bronchitis w/ bronchospasm.    Overnight Events: None  Interval HPI: Today is hospital day 2. Patient seen and examined at bedside.     REVIEW OF SYSTEMS:  CONSTITUTIONAL: (-) weakness, (-) fevers, (-) chills  EYES/ENT: (-) visual changes,  (-) vertigo,  (-) throat pain   NECK:  (-) pain, (-) stiffness  RESPIRATORY:  (-) shortness of breath, (-) cough,  (-) wheezing,  (-) hemoptysis   CARDIOVASCULAR:  (-) chest pain, (-) palpitations  GASTROINTESTINAL:  (-) abdominal or epigastric pain, (-) nausea, (-) vomiting, (-) diarrhea, (-) constipation, (-) melena,  (-) hematemesis,  (-) hematochezia  GENITOURINARY: (-) dysuria, (-) frequency, (-) hematuria  NEUROLOGICAL: (-) numbness, (-) weakness  SKIN: (-) itching, (-) rashes, (-) lesions    Vital Signs Last 24 Hrs  T(C): 36.6 (12 May 2024 05:06), Max: 36.7 (11 May 2024 12:30)  T(F): 97.8 (12 May 2024 05:06), Max: 98 (11 May 2024 12:30)  HR: 73 (12 May 2024 05:06) (73 - 100)  BP: 120/75 (12 May 2024 05:06) (120/75 - 132/73)  BP(mean): --  RR: 17 (12 May 2024 05:06) (17 - 18)  SpO2: 95% (12 May 2024 05:06) (93% - 96%)    Parameters below as of 12 May 2024 05:06  Patient On (Oxygen Delivery Method): nasal cannula  O2 Flow (L/min): 3      PHYSICAL EXAM:  GENERAL: NAD, lying in bed comfortably  HEAD:  Atraumatic, Normocephalic  EYES: EOMI, conjunctiva and sclera clear  ENT: Moist mucous membranes  NECK: Supple, No JVD  CHEST/LUNG: Clear to auscultation bilaterally, good air entry bilaterally; No wheezing, rales, or rhonchi. Unlabored respirations  HEART: Regular rate and rhythm. S1 and S2. No murmurs, rubs, or gallops  ABDOMEN: Soft, Nontender, Nondistended. Bowel sounds present.   EXTREMITIES:  2+ Peripheral Pulses. No clubbing, cyanosis, or edema  NERVOUS SYSTEM:  Alert & Oriented X3, speech clear. No deficits.  MSK: FROM all 4 extremities, full and equal strength  SKIN: No rashes, bruises, or other lesions    LABS:   All Labs Personally Reviewed                         14.3   7.12  )-----------( 183      ( 12 May 2024 07:07 )             41.5     05-10    139  |  101  |  22  ----------------------------<  107<H>  3.7   |  31  |  0.78    Ca    8.9      10 May 2024 16:30  Mg     2.1     05-10    TPro  7.3  /  Alb  3.6  /  TBili  0.6  /  DBili  x   /  AST  34  /  ALT  46<H>  /  AlkPhos  64  05-10          Blood Culture:   I&O's Summary    11 May 2024 07:01  -  12 May 2024 07:00  --------------------------------------------------------  IN: 440 mL / OUT: 0 mL / NET: 440 mL      CAPILLARY BLOOD GLUCOSE          RADIOLOGY/EKG:  All Imaging and EKGs Personally Reviewed     Xray Chest 1 View- PORTABLE-Urgent (05.10.24 @ 16:57)   IMPRESSION:  No radiographic evidence of active chest disease..      MEDICATIONS:  MEDICATIONS  (STANDING):  albuterol/ipratropium for Nebulization 3 milliLiter(s) Nebulizer every 6 hours  amLODIPine   Tablet 5 milliGRAM(s) Oral daily  aspirin enteric coated 81 milliGRAM(s) Oral daily  budesonide  80 MICROgram(s)/formoterol 4.5 MICROgram(s) Inhaler 2 Puff(s) Inhalation every 12 hours  enoxaparin Injectable 40 milliGRAM(s) SubCutaneous every 24 hours  hydrochlorothiazide 12.5 milliGRAM(s) Oral daily  metoprolol succinate ER 50 milliGRAM(s) Oral two times a day  predniSONE   Tablet 40 milliGRAM(s) Oral daily  tamsulosin 0.4 milliGRAM(s) Oral at bedtime  valsartan 320 milliGRAM(s) Oral daily        Discussed with attending,  ***.         Patient is a 59-year-old male with HTN, BPH, Obesity, hx of SVT, s/p ablation, presents to the ED, c/o cough for about 10 days, but for the past few days, has been having wheezing, increased dyspnea, admitted for acute viral bronchitis w/ bronchospasm.    Overnight Events: None  Interval HPI: Today is hospital day 2. Patient seen and examined at bedside. Patient feels he is ready to go home. Aware plan to evaluate saturation off of oxygen, if maintains sats, can go home. No further questions at this time.     REVIEW OF SYSTEMS:  CONSTITUTIONAL: (-) weakness, (-) fevers, (-) chills  EYES/ENT: (-) visual changes,  (-) vertigo,  (-) throat pain   NECK:  (-) pain, (-) stiffness  RESPIRATORY:  (-) shortness of breath, (-) cough,  (-) wheezing,  (-) hemoptysis   CARDIOVASCULAR:  (-) chest pain, (-) palpitations  GASTROINTESTINAL:  (-) abdominal or epigastric pain, (-) nausea, (-) vomiting, (-) diarrhea, (-) constipation, (-) melena,  (-) hematemesis,  (-) hematochezia  GENITOURINARY: (-) dysuria, (-) frequency, (-) hematuria  NEUROLOGICAL: (-) numbness, (-) weakness  SKIN: (-) itching, (-) rashes, (-) lesions    Vital Signs Last 24 Hrs  T(C): 36.6 (12 May 2024 05:06), Max: 36.7 (11 May 2024 12:30)  T(F): 97.8 (12 May 2024 05:06), Max: 98 (11 May 2024 12:30)  HR: 73 (12 May 2024 05:06) (73 - 100)  BP: 120/75 (12 May 2024 05:06) (120/75 - 132/73)  BP(mean): --  RR: 17 (12 May 2024 05:06) (17 - 18)  SpO2: 95% (12 May 2024 05:06) (93% - 96%)    Parameters below as of 12 May 2024 05:06  Patient On (Oxygen Delivery Method): nasal cannula  O2 Flow (L/min): 3      PHYSICAL EXAM:  GENERAL: NAD, lying in bed comfortably  HEAD:  Atraumatic, Normocephalic  EYES: EOMI, conjunctiva and sclera clear  ENT: Moist mucous membranes  NECK: Supple, No JVD  CHEST/LUNG: Clear to auscultation bilaterally, good air entry bilaterally; No wheezing, rales, or rhonchi. Unlabored respirations  HEART: Regular rate and rhythm. S1 and S2. No murmurs, rubs, or gallops  ABDOMEN: Soft, Nontender, Nondistended. Bowel sounds present.   EXTREMITIES:  2+ Peripheral Pulses. No clubbing, cyanosis, or edema  NERVOUS SYSTEM:  Alert & Oriented X3, speech clear. No deficits.  SKIN: No rashes, bruises, or other lesions    LABS:   All Labs Personally Reviewed                         14.3   7.12  )-----------( 183      ( 12 May 2024 07:07 )             41.5     05-10    139  |  101  |  22  ----------------------------<  107<H>  3.7   |  31  |  0.78    Ca    8.9      10 May 2024 16:30  Mg     2.1     05-10    TPro  7.3  /  Alb  3.6  /  TBili  0.6  /  DBili  x   /  AST  34  /  ALT  46<H>  /  AlkPhos  64  05-10          Blood Culture:   I&O's Summary    11 May 2024 07:01  -  12 May 2024 07:00  --------------------------------------------------------  IN: 440 mL / OUT: 0 mL / NET: 440 mL      CAPILLARY BLOOD GLUCOSE        RADIOLOGY/EKG:  All Imaging and EKGs Personally Reviewed     Xray Chest 1 View- PORTABLE-Urgent (05.10.24 @ 16:57)   IMPRESSION:  No radiographic evidence of active chest disease..      MEDICATIONS:  MEDICATIONS  (STANDING):  albuterol/ipratropium for Nebulization 3 milliLiter(s) Nebulizer every 6 hours  amLODIPine   Tablet 5 milliGRAM(s) Oral daily  aspirin enteric coated 81 milliGRAM(s) Oral daily  budesonide  80 MICROgram(s)/formoterol 4.5 MICROgram(s) Inhaler 2 Puff(s) Inhalation every 12 hours  enoxaparin Injectable 40 milliGRAM(s) SubCutaneous every 24 hours  hydrochlorothiazide 12.5 milliGRAM(s) Oral daily  metoprolol succinate ER 50 milliGRAM(s) Oral two times a day  predniSONE   Tablet 40 milliGRAM(s) Oral daily  tamsulosin 0.4 milliGRAM(s) Oral at bedtime  valsartan 320 milliGRAM(s) Oral daily        Discussed with attending,  ***.

## 2024-05-12 NOTE — DISCHARGE NOTE PROVIDER - NSDCCPCAREPLAN_GEN_ALL_CORE_FT
PRINCIPAL DISCHARGE DIAGNOSIS  Diagnosis: Bronchitis  Assessment and Plan of Treatment: Bronchitis is inflammation of the airways that extend from the windpipe into the lungs (bronchi). The inflammation often causes mucus to develop. This leads to a cough, which is the most common symptom of bronchitis. In acute bronchitis, the condition usually develops suddenly and goes away over time, usually in a couple weeks. Smoking, allergies, and asthma can make bronchitis worse. Repeated episodes of bronchitis may cause further lung problems.   Please continue take prednisone 40mg once daily until gone.  Please continue taking inhaler sent to your pharmacy, 2 puffs twice daily.   HOME CARE INSTRUCTIONS  Get plenty of rest.    Drink enough fluids to keep your urine clear or pale yellow (unless you have a medical condition that requires fluid restriction). Increasing fluids may help thin your respiratory secretions (sputum) and reduce chest congestion, and it will prevent dehydration.    Take medicines only as directed by your health care provider.   If you were prescribed an antibiotic medicine, finish it all even if you start to feel better.   Avoid smoking and secondhand smoke. Exposure to cigarette smoke or irritating chemicals will make bronchitis worse. If you are a smoker, consider using nicotine gum or skin patches to help control withdrawal symptoms. Quitting smoking will help your lungs heal faster.    Reduce the chances of another bout of acute bronchitis by washing your hands frequently, avoiding people with cold symptoms, and trying not to touch your hands to your mouth, nose, or eyes.    Keep all follow-up visits as directed by your health care provider.    SEEK IMMEDIATE MEDICAL CARE IF:  You develop an increased fever or chills.    You have chest pain/severe shortness of breath.  You have bloody sputum.     You faint or repeatedly feel like you are going to pass out.  You develop repeated vomiting.  You develop a severe headache.   Please follow-up with primary care provider in 7 days, and follow-up with Pulmonology to be evaluated for sleep apnea.

## 2024-05-12 NOTE — DISCHARGE NOTE PROVIDER - HOSPITAL COURSE
Patient is a 59-year-old male with current cigar smoker, HTN, BPH, Obesity, hx of SVT, s/p ablation, presented to the ED with c/o cough for about 10 days, but for the past few days, has been having wheezing, increased dyspnea. Patient was found to be positive for entero/rhinovirus, was placed on NC oxygen to maintain saturations >90%, was started on oral steroids and nebulization with recovery of oxygen saturations to 94-98% without oxygen while ambulating and at rest. Patient is to follow-up with Pulmonology outpatient for possible sleep study to evaluate for YAZ. Patient was counseled and offered smoking cessation products but declined at this time. Patient is to follow-up with his primary care provider in the next week. Dr. Goel given discharge hand-off. Patient is medically optimized for discharge home.      IMAGING:  US Duplex Venous Lower Ext Ltd, Right (05.10.24 @ 16:43)   IMPRESSION:  No evidence of right lower extremity deep venous thrombosis.    Xray Chest 1 View- PORTABLE-Urgent (05.10.24 @ 16:57)   IMPRESSION:  No radiographic evidence of active chest disease..    12 Lead ECG (05.10.24 @ 17:06)   Normal sinus rhythm  Normal ECG  No previous ECGs available        VITALS:  Vital Signs Last 24 Hrs  T(C): 36.7 (12 May 2024 12:40), Max: 36.7 (12 May 2024 12:40)  T(F): 98 (12 May 2024 12:40), Max: 98 (12 May 2024 12:40)  HR: 88 (12 May 2024 12:54) (73 - 90)  BP: 149/78 (12 May 2024 12:54) (120/75 - 149/78)  BP(mean): --  RR: 20 (12 May 2024 12:54) (17 - 20)  SpO2: 95% (12 May 2024 12:54) (91% - 96%)    Parameters below as of 12 May 2024 12:42  Patient On (Oxygen Delivery Method): room air    PHYSICAL EXAM:  GENERAL: NAD, lying in bed comfortably  HEAD:  Atraumatic, normocephalic  EYES: EOMI, conjunctiva and sclera clear  NECK: Supple, trachea midline, no JVD  HEART: Regular rate and rhythm, no murmurs, rubs, or gallops  LUNGS: Unlabored respirations.  Clear to auscultation bilaterally, no crackles, wheezing, or rhonchi  ABDOMEN: Soft, nontender, nondistended, +BS  EXTREMITIES: 2+ peripheral pulses bilaterally. No clubbing, cyanosis, or edema  NERVOUS SYSTEM:  A&Ox3, moving all extremities, no focal deficits   SKIN: No rashes or lesions       Patient is a 59-year-old male with current cigar smoker, HTN, BPH, Obesity, hx of SVT, s/p ablation, presented to the ED with c/o cough for about 10 days, but for the past few days, has been having wheezing, increased dyspnea. Patient was found to be positive for entero/rhinovirus, was placed on NC oxygen to maintain saturations >90%, was started on oral steroids and nebulization with recovery of oxygen saturations to 94-98% without oxygen while ambulating and at rest. Patient is to follow-up with Pulmonology outpatient for possible sleep study to evaluate for YAZ. Patient was counseled and offered smoking cessation products but declined at this time. Patient is to follow-up with his primary care provider in the next week. Dr. Goel given discharge hand-off. Patient is medically optimized for discharge home.      IMAGING:  US Duplex Venous Lower Ext Ltd, Right (05.10.24 @ 16:43)   IMPRESSION:  No evidence of right lower extremity deep venous thrombosis.    Xray Chest 1 View- PORTABLE-Urgent (05.10.24 @ 16:57)   IMPRESSION:  No radiographic evidence of active chest disease..    12 Lead ECG (05.10.24 @ 17:06)   Normal sinus rhythm  Normal ECG  No previous ECGs available        VITALS:  Vital Signs Last 24 Hrs  T(C): 36.7 (12 May 2024 12:40), Max: 36.7 (12 May 2024 12:40)  T(F): 98 (12 May 2024 12:40), Max: 98 (12 May 2024 12:40)  HR: 88 (12 May 2024 12:54) (73 - 90)  BP: 149/78 (12 May 2024 12:54) (120/75 - 149/78)  BP(mean): --  RR: 20 (12 May 2024 12:54) (17 - 20)  SpO2: 95% (12 May 2024 12:54) (91% - 96%)    Parameters below as of 12 May 2024 12:42  Patient On (Oxygen Delivery Method): room air    PHYSICAL EXAM:  GENERAL: NAD, lying in bed comfortably, obese  HEAD:  Atraumatic, normocephalic  EYES: EOMI, conjunctiva and sclera clear  NECK: Supple, trachea midline, no JVD  HEART: Regular rate and rhythm, no murmurs, rubs, or gallops  LUNGS: Unlabored respirations.  Clear to auscultation bilaterally, no crackles, wheezing, or rhonchi  ABDOMEN: Soft, nontender, nondistended, +BS  EXTREMITIES: 2+ peripheral pulses bilaterally. No clubbing, cyanosis, or edema  NERVOUS SYSTEM:  A&Ox3, moving all extremities, no focal deficits   SKIN: No rashes or lesions

## 2024-05-12 NOTE — DISCHARGE NOTE NURSING/CASE MANAGEMENT/SOCIAL WORK - NSDCPEEMAIL_GEN_ALL_CORE
[facility] for Tobacco Control email [email or web address]

## 2024-05-12 NOTE — DISCHARGE NOTE PROVIDER - CARE PROVIDERS DIRECT ADDRESSES
[email or web address]

## 2024-05-12 NOTE — DISCHARGE NOTE PROVIDER - NSDCMRMEDTOKEN_GEN_ALL_CORE_FT
amLODIPine 5 mg oral tablet: 1 tab(s) orally once a day  Aspirin Enteric Coated 81 mg oral delayed release tablet: 1 tab(s) orally once a day  budesonide-formoterol 80 mcg-4.5 mcg/inh inhalation aerosol: 2 puff(s) inhaled 2 times a day  hydroCHLOROthiazide 12.5 mg oral capsule: 1 cap(s) orally once a day  Metoprolol Succinate ER 50 mg oral tablet, extended release: 1 tab(s) orally 2 times a day  predniSONE 20 mg oral tablet: 2 tab(s) orally once a day  tamsulosin 0.4 mg oral capsule: 1 cap(s) orally once a day  valsartan 320 mg oral tablet: 1 tab(s) orally once a day

## 2024-05-12 NOTE — DISCHARGE NOTE NURSING/CASE MANAGEMENT/SOCIAL WORK - NSDCPEFALRISK_GEN_ALL_CORE
For information on Fall & Injury Prevention, visit: [email or web address] OR  [email or web address] OR  https://www.cdc.gov/steadi/patient.html

## 2024-05-12 NOTE — DISCHARGE NOTE PROVIDER - CARE PROVIDER_API CALL
Krista Joyner  Internal Medicine  [address], [address]  [place] [address]  Phone: [phone]  Fax: [phone]  Follow Up Time: 7-10 Days    Jennifer Goel  Family Medicine  [address] [place] [address], [address]  [place] [address]  Phone: [phone]  Fax: [phone]  Established Patient  Follow Up Time: 7-10 Days

## 2024-05-12 NOTE — DISCHARGE NOTE PROVIDER - PROVIDER TOKENS
PROVIDER:[TOKEN:[[id number]],FOLLOWUP:[7-10 Days]],PROVIDER:[TOKEN:[[id number]],FOLLOWUP:[7-10 Days],ESTABLISHEDPATIENT:[T]]

## 2024-05-12 NOTE — DISCHARGE NOTE NURSING/CASE MANAGEMENT/SOCIAL WORK - PATIENT PORTAL LINK FT
You can access the FollowMyHealth Patient Portal offered by [facility] by registering at the following website: [email or web address]. By joining [facility]’s FollowMyHealth portal, you will also be able to view your health information using other applications (apps) compatible with our system.

## 2024-05-14 ENCOUNTER — APPOINTMENT (OUTPATIENT)
Dept: INTERNAL MEDICINE | Facility: CLINIC | Age: 59
End: 2024-05-14
Payer: MEDICARE

## 2024-05-14 VITALS
BODY MASS INDEX: 36.22 KG/M2 | TEMPERATURE: 97.5 F | DIASTOLIC BLOOD PRESSURE: 96 MMHG | HEIGHT: 70 IN | OXYGEN SATURATION: 94 % | WEIGHT: 253 LBS | HEART RATE: 87 BPM | SYSTOLIC BLOOD PRESSURE: 150 MMHG | RESPIRATION RATE: 16 BRPM

## 2024-05-14 DIAGNOSIS — Z09 ENCOUNTER FOR FOLLOW-UP EXAMINATION AFTER COMPLETED TREATMENT FOR CONDITIONS OTHER THAN MALIGNANT NEOPLASM: ICD-10-CM

## 2024-05-14 PROCEDURE — 99495 TRANSJ CARE MGMT MOD F2F 14D: CPT

## 2024-05-14 RX ORDER — BUDESONIDE AND FORMOTEROL FUMARATE DIHYDRATE 80; 4.5 UG/1; UG/1
80-4.5 AEROSOL RESPIRATORY (INHALATION) TWICE DAILY
Qty: 1 | Refills: 1 | Status: ACTIVE | COMMUNITY
Start: 2024-05-14 | End: 1900-01-01

## 2024-05-14 RX ORDER — PREDNISONE 20 MG/1
20 TABLET ORAL DAILY
Qty: 14 | Refills: 0 | Status: ACTIVE | COMMUNITY
Start: 2024-05-14 | End: 1900-01-01

## 2024-05-14 NOTE — PLAN
[FreeTextEntry1] : #Hospital F/U Patient stable after hypoxic resp. failure secondary to rhinovirus with 2 day hospitalization. Patient missing inhaler and steroid script at pharmacy so will send these over. There was concern for YAZ inpatient. Patient stop-bang is 7 so will refer to sleep medicine. Patient's BP is elevated today, reports adherence to valsartan and norvasc. Offered up titration of Norvasc to 10mg but patient declining at this time and stating BP is usually lower at home. Hospitalization BP on day of discharge was normotensive. Also went over chronic lymphedema and to try and wear compression socks, leg elevation, and daily walking. Norvasc could be possible culprit but ARB should mitigate this.   Patient to f/u with PCP Dr. Goel to re-assess if BP remains elevated on current regimen.

## 2024-05-14 NOTE — HISTORY OF PRESENT ILLNESS
[Post-hospitalization from ___ Hospital] : Post-hospitalization from [unfilled] Hospital [Admitted on: ___] : The patient was admitted on [unfilled] [Discharged on ___] : discharged on [unfilled] [Discharge Summary] : discharge summary [Pertinent Labs] : pertinent labs [Radiology Findings] : radiology findings [Discharge Med List] : discharge medication list [FreeTextEntry2] : Per Discharge Summary, Hospital Course: Patient is a 59-year-old male with current cigar smoker, HTN, BPH, Obesity, hx of SVT, s/p ablation, presented to the ED with c/o cough for about 10 days, but for the past few days, has been having wheezing, increased dyspnea. Patient was found to be positive for entero/rhinovirus, was placed on NC oxygen to maintain saturations >90%, was started on oral steroids and nebulization with recovery of oxygen saturations to 94-98% without oxygen while ambulating and at rest. Patient is to follow-up with Pulmonology outpatient for possible sleep study to evaluate for YAZ. Patient was counseled and offered smoking cessation products but declined at this time. Patient is to follow-up with his primary care provider in the next week. Dr. Goel given discharge hand-off. Patient is medically optimized for discharge home.   Patient was hospitalized for two days. Today feels breathing is back to normal. He notes that his pharmacy does not have the order of prednisone 40mg QD to take until gone and the symbicort inhaler. Also complaining that cough is still presents.   Mentioned chronic lymphedema and requesting recs.   BP elevated to 150/90s. Patient asymptomatic. Prev. BP in april wnl.

## 2024-05-14 NOTE — PHYSICAL EXAM
[No Respiratory Distress] : no respiratory distress  [No Accessory Muscle Use] : no accessory muscle use [Clear to Auscultation] : lungs were clear to auscultation bilaterally [Alert and Oriented x3] : oriented to person, place, and time [Normal] : affect was normal and insight and judgment were intact [25687 - Moderate Complexity requires multiple possible diagnoses and/or the management options, moderate complexity of the medical data (tests, etc.) to be reviewed, and moderate risk of significant complications, morbidity, and/or mortality as well as co] : Moderate Complexity [de-identified] : Decreased respiratory effort/air flow (chronic in nature),  [de-identified] : B/L chronic lymphedema swelling

## 2024-05-20 ENCOUNTER — RX RENEWAL (OUTPATIENT)
Age: 59
End: 2024-05-20

## 2024-05-22 ENCOUNTER — APPOINTMENT (OUTPATIENT)
Dept: PULMONOLOGY | Facility: CLINIC | Age: 59
End: 2024-05-22
Payer: MEDICARE

## 2024-05-22 VITALS
OXYGEN SATURATION: 94 % | HEIGHT: 70 IN | BODY MASS INDEX: 35.36 KG/M2 | SYSTOLIC BLOOD PRESSURE: 128 MMHG | WEIGHT: 247 LBS | TEMPERATURE: 97.7 F | DIASTOLIC BLOOD PRESSURE: 77 MMHG | HEART RATE: 90 BPM

## 2024-05-22 DIAGNOSIS — R06.83 SNORING: ICD-10-CM

## 2024-05-22 DIAGNOSIS — F17.200 NICOTINE DEPENDENCE, UNSPECIFIED, UNCOMPLICATED: ICD-10-CM

## 2024-05-22 DIAGNOSIS — R05.9 COUGH, UNSPECIFIED: ICD-10-CM

## 2024-05-22 PROCEDURE — 99205 OFFICE O/P NEW HI 60 MIN: CPT

## 2024-05-22 NOTE — REVIEW OF SYSTEMS
[Cough] : cough [Sputum] : sputum [Dyspnea] : dyspnea [SOB on Exertion] : sob on exertion [Chest Tightness] : no chest tightness [Pleuritic Pain] : no pleuritic pain [Negative] : Cardiovascular

## 2024-05-22 NOTE — PHYSICAL EXAM
[No Acute Distress] : no acute distress [II] : Mallampati Class: II [Normal Appearance] : normal appearance [No Neck Mass] : no neck mass [Normal Rate/Rhythm] : normal rate/rhythm [Normal S1, S2] : normal s1, s2 [No Murmurs] : no murmurs [No Resp Distress] : no resp distress [Clear to Auscultation Bilaterally] : clear to auscultation bilaterally [No Abnormalities] : no abnormalities [Normal Gait] : normal gait [No Clubbing] : no clubbing [No Cyanosis] : no cyanosis [No Edema] : no edema [Oriented x3] : oriented x3 [Normal Affect] : normal affect [TextBox_105] : varicosities noted RLE > LLE

## 2024-05-22 NOTE — HISTORY OF PRESENT ILLNESS
[TextBox_4] : Patient is a 59-year-old male with current cigar smoker, HTN, BPH, Obesity, hx of SVT, s/p ablation, presented to [facility] with c/o cough for about 10 days, Also noted to have wheezing and dyspnea.  HE was positive for enterorhinovirus, treated with steroids and nebulizers and oxygenation improved.  While there he required oxygen and ws noted to have hypercapnea.  Last serum HCO3 on chemistry in April 2024 was WNL but he has had elevated HCO3 levels on other lab draws to as high as 31.  ON admission to [facility] HCO3 was noted to be 32.  There was no ABG performed.   Duplex doppler at [facility] was negative for DVT bilaterally He takes budesonide-formoterol 80/4.5 2 puffs BID and HCTZ 12.5 mg daily. ALso treated with prednisone 40mg daily for a few days.   Referred for evaluation and to rule out YAZ.  PMH is significant for HTN, eczema, no history of asthma.  Patient is learning disabled, and is accompanied by his sister today.  Reports first episode of breathing difficulties leading to hospitalization. Patient has exertional dyspnea, lower extremity edema. Also experiencing wheezing. He snores and appears hypersomnolent. He scored 7 on the STOP BANG questionnaire.   [TextBox_29] : smokes cigars- 3 a week according to his sister.

## 2024-05-22 NOTE — ASSESSMENT
[FreeTextEntry1] : 59 year old male who smokes cigars, with HTN, BPH, Obesity, history of SVT post ablation, recently admitted to [facility] with enterorhinovirus, wheezing and hypoxemia.  CXR did not show pneumonia. He improved with steroids and bronchodilators. On Follow up today he is breathing well.  Has signs and symptoms of YAZ, admits to snoring, sister reports that he appears somnolent at times.He was given ICS_ bronchodilator to use.  ON Pe today vSS. He desaturates while ambulating to oxygen saturation of 92%. LUngs are clear.  Cor RRR ext with prominent varicosities and edema of RLE, LLE with less edema.   Plan: 1- YAZ:   - PSG in lab with transcutaneous CO2 monitoring as he had evidence of hypercapnea.   -sleep physician appointment 2- dyspnea/wheezing:  appeared to have hyperreactivity related to enterorhinovirus infection.  May also have COPD - continue ICS-LABA for now with albuterol as needed- gave him spacer to use with [facility] - complete PFT - may need to be switched to nebulized medications for ease of use.    F/U at time of PFT.

## 2024-06-06 ENCOUNTER — APPOINTMENT (OUTPATIENT)
Dept: FAMILY MEDICINE | Facility: CLINIC | Age: 59
End: 2024-06-06
Payer: MEDICARE

## 2024-06-06 VITALS
HEIGHT: 70 IN | HEART RATE: 85 BPM | OXYGEN SATURATION: 95 % | RESPIRATION RATE: 16 BRPM | WEIGHT: 250 LBS | BODY MASS INDEX: 35.79 KG/M2 | DIASTOLIC BLOOD PRESSURE: 76 MMHG | TEMPERATURE: 98 F | SYSTOLIC BLOOD PRESSURE: 130 MMHG

## 2024-06-06 DIAGNOSIS — I10 ESSENTIAL (PRIMARY) HYPERTENSION: ICD-10-CM

## 2024-06-06 PROCEDURE — G2211 COMPLEX E/M VISIT ADD ON: CPT

## 2024-06-06 PROCEDURE — 99213 OFFICE O/P EST LOW 20 MIN: CPT

## 2024-06-06 NOTE — PLAN
[FreeTextEntry1] : #HTN  -controlled  -has made dietary changes, cut out cold cuts  -following w/ cardio, had to cancel apt in May but plans to reschedule  -continue current regimen of amlodipine 5 mg QD, HCTZ 12.5 mg QD, Metoprolol succinate 50 mg QD, valsartan 320 mg QD  -has f/u in October for CPE

## 2024-06-06 NOTE — HISTORY OF PRESENT ILLNESS
[FreeTextEntry1] : follow-up   [de-identified] : 58 yo M PMH cardiomyopathy, HLD, HTN, PVCs s/p ablation, Vitamin D Def current smoker of cigars (few a day) presenting today for hospital follow-up.  Since our last visit, he had a hospitalization for enterorhinovirus leading to hypoxia. He has since seen a pulmonologist, awaiting sleep apnea evaluation.    He had his hopsital DC follow-up w/ Dr. Wooten, BP was noted to be elevated, he is here today for HTN follow-up.

## 2024-06-06 NOTE — PHYSICAL EXAM
[Normal] : no respiratory distress, lungs were clear to auscultation bilaterally and no accessory muscle use [Normal Rate] : normal rate  [Regular Rhythm] : with a regular rhythm initial  screen

## 2024-06-06 NOTE — ASSESSMENT
[FreeTextEntry1] : 60 yo M PMH cardiomyopathy, HLD, HTN, PVCs s/p ablation, Vitamin D Def current smoker of cigars (few a day) presenting today for hospital follow-up.

## 2024-06-19 ENCOUNTER — RX RENEWAL (OUTPATIENT)
Age: 59
End: 2024-06-19

## 2024-06-19 RX ORDER — HYDROCHLOROTHIAZIDE 12.5 MG/1
12.5 TABLET ORAL DAILY
Qty: 90 | Refills: 1 | Status: ACTIVE | COMMUNITY
Start: 2023-03-20 | End: 1900-01-01

## 2024-06-23 RX ORDER — AMLODIPINE BESYLATE 5 MG/1
5 TABLET ORAL
Qty: 90 | Refills: 0 | Status: ACTIVE | COMMUNITY
Start: 2023-04-10 | End: 1900-01-01

## 2024-08-08 NOTE — ED ADULT NURSE NOTE - CAS EDP DISCH DISPOSITION ADMI
Well  at 18 Months     Nutrition  Family meals are important for your baby. Let him eat with you. This helps him learn that eating is a time to be together and talk with others. Don't make mealtime a orona. Let your child feed himself. Your child should use a spoon and drink from an open-rimmed cup (not a sippy-cup).  Whole milk 16-20 oz a day, Juice no more than 4 oz a day, Water is the preferred beverage throughout the day.     Development   Children at this age should be learning many new words. You can help your child's vocabulary grow by showing and naming lots of things. Children at this age can engage in pretend play. They will look where you point and will try to get your attention when they want to point something out to you. Children have many different feelings and behaviors such as pleasure, anger, van, curiosity, warmth, and assertiveness. Praise your child for doing things that you like.    Toilet Training  At 18 months, most toddlers are not yet showing signs that they are ready for toilet training. When toddlers report to parents that they have wet or soiled their diaper, they are starting to be aware that they prefer dryness. This is a good sign and you should praise your child. Toddlers are naturally curious about the use of the bathroom by other people. Let them watch you or other family members use the toilet. It is important not to put too many demands on a child or shame the child during toilet training.    Behavior Control  Toddlers sometimes seem out of control, or too stubborn or demanding. At this age, children often say \"no\". To help children learn about rules:  Divert and substitute. If a child is playing with something you don't want him to have, replace it with another object or toy that he enjoys. This approach avoids a fight and does not place children in a situation where they'll say \"no.\"   Teach and lead. Have as few rules as necessary and enforce them. Make rules for 
Telemetry

## 2024-08-13 ENCOUNTER — RX RENEWAL (OUTPATIENT)
Age: 59
End: 2024-08-13

## 2024-09-11 ENCOUNTER — RX RENEWAL (OUTPATIENT)
Age: 59
End: 2024-09-11

## 2024-09-17 ENCOUNTER — APPOINTMENT (OUTPATIENT)
Dept: UROLOGY | Facility: CLINIC | Age: 59
End: 2024-09-17
Payer: MEDICARE

## 2024-09-17 ENCOUNTER — APPOINTMENT (OUTPATIENT)
Dept: UROLOGY | Facility: CLINIC | Age: 59
End: 2024-09-17

## 2024-09-17 VITALS
TEMPERATURE: 98 F | DIASTOLIC BLOOD PRESSURE: 76 MMHG | SYSTOLIC BLOOD PRESSURE: 138 MMHG | RESPIRATION RATE: 17 BRPM | HEIGHT: 70 IN | BODY MASS INDEX: 34.36 KG/M2 | HEART RATE: 56 BPM | WEIGHT: 240 LBS | OXYGEN SATURATION: 95 %

## 2024-09-17 DIAGNOSIS — N40.1 BENIGN PROSTATIC HYPERPLASIA WITH LOWER URINARY TRACT SYMPMS: ICD-10-CM

## 2024-09-17 DIAGNOSIS — N13.8 BENIGN PROSTATIC HYPERPLASIA WITH LOWER URINARY TRACT SYMPMS: ICD-10-CM

## 2024-09-17 PROCEDURE — 99213 OFFICE O/P EST LOW 20 MIN: CPT

## 2024-09-17 PROCEDURE — 51741 ELECTRO-UROFLOWMETRY FIRST: CPT

## 2024-09-17 PROCEDURE — 51798 US URINE CAPACITY MEASURE: CPT

## 2024-09-17 NOTE — SIGNATURES
[TextEntry] : Kike Paz M.D. Minimally Invasive and Robotic Urologic Surgery [facility] | [facility] [employer] [facility] Tel: [phone] | Fax: [phone] | email: [email or web address]

## 2024-09-17 NOTE — ASSESSMENT
[FreeTextEntry1] : Mr. ANN is a 58 year-year-old White M with mild mental retardation, cardiomyopathy, HLD, HTN, PVCs s/p ablation, Vitamin D Def current smoker of cigars (few a day).  BPH with remarkable improvement with tamsulosin.   We will follow-up in 6 months with uroflow PVR IPSS.  We discussed the effects that BPH can cause on the bladder: BPH can cause detrusor overactivity which results in urgency and frequency and in some cases, incontinence. Over a longer time,some men may develop large volume/acontractile bladders, while other men develop small or normal volume bladders with loss of compliance. Neither is ideal and both represent end stage bladder damage that can not be fixed and can cause kidney injury. I explained the 3 main jobs of the bladder which are to 1) store urine, 2) evacuate urine and 3) keep urine at low pressure to prevent upper tract injury. I explained the mechanisms of urinary tract infections, hydronephrosis and kidney injury due to urinary retention.  These consequences can be severe, irreversible and even life threatening.   We then discussed the treatment options for BPH. We generally start with medical management and proceed to surgery if necessary. Alpha blockers act to relax the prostate while 5-alpha reductase inhibitors (5-Ravin) shrink the prostate. 5-BLAKE's are most effective in patients who have prostates that are >40gm. The former are fairly fast acting (in a matter of days-weeks) while the latter can take up to 3-6 months to take effect.

## 2024-09-17 NOTE — HISTORY OF PRESENT ILLNESS
[FreeTextEntry1] : Mr. ANN is a 58 year-year-old White M with mild mental retardation, cardiomyopathy, HLD, HTN, PVCs s/p ablation, Vitamin D Def current smoker of cigars (few a day. Comes today to clinic for urinary symptoms. Endorsing moderate to severe LUTS for at least a year (see IPSS), both voiding and storage symptoms. HBA1c >7, BMI >30 Uncle diagnosed with prostate cancer at age 80.  Father  of kidney cancer at age 51, brother has been diagnosed with stage I kidney cancer, sister had renal lesions removed benign pathology.   23 Patient endorsing remarkable improvement with tamsulosin.  Pleased with results.  Wishes to continue. Reporting postvoid dribbling and spray urine stream sometimes.   Denies fevers, chills, flank pain, hematuria, AUR.  Uroflow good shape, PVR 89 cc.

## 2024-09-17 NOTE — REASON FOR VISIT
[Follow-up Visit ___] : a follow-up visit  for [unfilled] [Family Member] : family member [Spouse] : spouse [Initial Visit ___] : [unfilled] is here today for an initial visit  for [unfilled]

## 2024-10-09 ENCOUNTER — RX RENEWAL (OUTPATIENT)
Age: 59
End: 2024-10-09

## 2024-10-15 ENCOUNTER — APPOINTMENT (OUTPATIENT)
Dept: FAMILY MEDICINE | Facility: CLINIC | Age: 59
End: 2024-10-15
Payer: MEDICARE

## 2024-10-15 VITALS
SYSTOLIC BLOOD PRESSURE: 140 MMHG | RESPIRATION RATE: 18 BRPM | HEART RATE: 87 BPM | WEIGHT: 247 LBS | DIASTOLIC BLOOD PRESSURE: 96 MMHG | OXYGEN SATURATION: 96 % | TEMPERATURE: 97.2 F | BODY MASS INDEX: 35.36 KG/M2 | HEIGHT: 70 IN

## 2024-10-15 DIAGNOSIS — R91.1 SOLITARY PULMONARY NODULE: ICD-10-CM

## 2024-10-15 DIAGNOSIS — Z12.11 ENCOUNTER FOR SCREENING FOR MALIGNANT NEOPLASM OF COLON: ICD-10-CM

## 2024-10-15 DIAGNOSIS — Z00.00 ENCOUNTER FOR GENERAL ADULT MEDICAL EXAMINATION W/OUT ABNORMAL FINDINGS: ICD-10-CM

## 2024-10-15 PROCEDURE — G0439: CPT

## 2024-10-21 ENCOUNTER — RX RENEWAL (OUTPATIENT)
Age: 59
End: 2024-10-21

## 2024-11-05 ENCOUNTER — NON-APPOINTMENT (OUTPATIENT)
Age: 59
End: 2024-11-05

## 2024-11-06 ENCOUNTER — APPOINTMENT (OUTPATIENT)
Dept: CARDIOLOGY | Facility: CLINIC | Age: 59
End: 2024-11-06
Payer: MEDICARE

## 2024-11-06 ENCOUNTER — APPOINTMENT (OUTPATIENT)
Dept: CARDIOLOGY | Facility: CLINIC | Age: 59
End: 2024-11-06

## 2024-11-06 ENCOUNTER — NON-APPOINTMENT (OUTPATIENT)
Age: 59
End: 2024-11-06

## 2024-11-06 VITALS
SYSTOLIC BLOOD PRESSURE: 146 MMHG | RESPIRATION RATE: 19 BRPM | WEIGHT: 249 LBS | HEART RATE: 91 BPM | OXYGEN SATURATION: 95 % | BODY MASS INDEX: 35.65 KG/M2 | HEIGHT: 70 IN | DIASTOLIC BLOOD PRESSURE: 85 MMHG

## 2024-11-06 DIAGNOSIS — I49.3 VENTRICULAR PREMATURE DEPOLARIZATION: ICD-10-CM

## 2024-11-06 PROCEDURE — 93000 ELECTROCARDIOGRAM COMPLETE: CPT | Mod: 59

## 2024-11-06 PROCEDURE — 93242 EXT ECG>48HR<7D RECORDING: CPT

## 2024-11-06 PROCEDURE — 99215 OFFICE O/P EST HI 40 MIN: CPT

## 2024-11-19 PROCEDURE — 93244 EXT ECG>48HR<7D REV&INTERPJ: CPT

## 2024-12-04 ENCOUNTER — APPOINTMENT (OUTPATIENT)
Dept: CARDIOLOGY | Facility: CLINIC | Age: 59
End: 2024-12-04
Payer: MEDICARE

## 2024-12-04 ENCOUNTER — NON-APPOINTMENT (OUTPATIENT)
Age: 59
End: 2024-12-04

## 2024-12-04 VITALS
HEART RATE: 90 BPM | BODY MASS INDEX: 35.65 KG/M2 | SYSTOLIC BLOOD PRESSURE: 118 MMHG | RESPIRATION RATE: 16 BRPM | OXYGEN SATURATION: 90 % | HEIGHT: 70 IN | WEIGHT: 249 LBS | DIASTOLIC BLOOD PRESSURE: 74 MMHG

## 2024-12-04 PROCEDURE — 93306 TTE W/DOPPLER COMPLETE: CPT

## 2024-12-04 PROCEDURE — 93000 ELECTROCARDIOGRAM COMPLETE: CPT

## 2024-12-04 PROCEDURE — 99214 OFFICE O/P EST MOD 30 MIN: CPT

## 2024-12-30 ENCOUNTER — RX RENEWAL (OUTPATIENT)
Age: 59
End: 2024-12-30

## 2025-01-08 ENCOUNTER — APPOINTMENT (OUTPATIENT)
Dept: PULMONOLOGY | Facility: CLINIC | Age: 60
End: 2025-01-08
Payer: MEDICARE

## 2025-01-08 VITALS
WEIGHT: 246 LBS | HEART RATE: 89 BPM | DIASTOLIC BLOOD PRESSURE: 85 MMHG | SYSTOLIC BLOOD PRESSURE: 154 MMHG | BODY MASS INDEX: 36.43 KG/M2 | OXYGEN SATURATION: 96 % | HEIGHT: 69 IN

## 2025-01-08 DIAGNOSIS — R06.83 SNORING: ICD-10-CM

## 2025-01-08 DIAGNOSIS — R06.89 OTHER ABNORMALITIES OF BREATHING: ICD-10-CM

## 2025-01-08 PROCEDURE — 99214 OFFICE O/P EST MOD 30 MIN: CPT | Mod: 25

## 2025-01-08 PROCEDURE — 94010 BREATHING CAPACITY TEST: CPT

## 2025-01-08 PROCEDURE — ZZZZZ: CPT

## 2025-03-20 ENCOUNTER — NON-APPOINTMENT (OUTPATIENT)
Age: 60
End: 2025-03-20

## 2025-03-25 ENCOUNTER — APPOINTMENT (OUTPATIENT)
Dept: UROLOGY | Facility: CLINIC | Age: 60
End: 2025-03-25
Payer: MEDICARE

## 2025-03-25 VITALS
DIASTOLIC BLOOD PRESSURE: 84 MMHG | WEIGHT: 250 LBS | HEART RATE: 78 BPM | TEMPERATURE: 97.3 F | SYSTOLIC BLOOD PRESSURE: 156 MMHG | RESPIRATION RATE: 16 BRPM | OXYGEN SATURATION: 95 % | HEIGHT: 69 IN | BODY MASS INDEX: 37.03 KG/M2

## 2025-03-25 DIAGNOSIS — N13.8 BENIGN PROSTATIC HYPERPLASIA WITH LOWER URINARY TRACT SYMPMS: ICD-10-CM

## 2025-03-25 DIAGNOSIS — N40.1 BENIGN PROSTATIC HYPERPLASIA WITH LOWER URINARY TRACT SYMPMS: ICD-10-CM

## 2025-03-25 PROCEDURE — 51741 ELECTRO-UROFLOWMETRY FIRST: CPT

## 2025-03-25 PROCEDURE — 99213 OFFICE O/P EST LOW 20 MIN: CPT | Mod: 25

## 2025-03-25 PROCEDURE — 51798 US URINE CAPACITY MEASURE: CPT

## 2025-03-26 LAB
PSA FREE FLD-MCNC: 25 %
PSA FREE SERPL-MCNC: 0.37 NG/ML
PSA SERPL-MCNC: 1.48 NG/ML

## 2025-04-16 ENCOUNTER — RX RENEWAL (OUTPATIENT)
Age: 60
End: 2025-04-16

## 2025-04-26 ENCOUNTER — OUTPATIENT (OUTPATIENT)
Dept: OUTPATIENT SERVICES | Facility: HOSPITAL | Age: 60
LOS: 1 days | End: 2025-04-26
Payer: MEDICARE

## 2025-04-26 ENCOUNTER — APPOINTMENT (OUTPATIENT)
Dept: SLEEP CENTER | Facility: CLINIC | Age: 60
End: 2025-04-26
Payer: MEDICARE

## 2025-04-26 DIAGNOSIS — Z98.890 OTHER SPECIFIED POSTPROCEDURAL STATES: Chronic | ICD-10-CM

## 2025-04-26 PROCEDURE — 95810 POLYSOM 6/> YRS 4/> PARAM: CPT | Mod: 26

## 2025-04-26 PROCEDURE — 95810 POLYSOM 6/> YRS 4/> PARAM: CPT

## 2025-04-29 DIAGNOSIS — G47.33 OBSTRUCTIVE SLEEP APNEA (ADULT) (PEDIATRIC): ICD-10-CM

## 2025-04-29 DIAGNOSIS — R06.89 OTHER ABNORMALITIES OF BREATHING: ICD-10-CM

## 2025-04-30 ENCOUNTER — TRANSCRIPTION ENCOUNTER (OUTPATIENT)
Age: 60
End: 2025-04-30

## 2025-05-01 DIAGNOSIS — G47.33 OBSTRUCTIVE SLEEP APNEA (ADULT) (PEDIATRIC): ICD-10-CM

## 2025-05-05 ENCOUNTER — NON-APPOINTMENT (OUTPATIENT)
Age: 60
End: 2025-05-05

## 2025-05-07 ENCOUNTER — APPOINTMENT (OUTPATIENT)
Dept: CARDIOLOGY | Facility: CLINIC | Age: 60
End: 2025-05-07
Payer: MEDICARE

## 2025-05-07 ENCOUNTER — NON-APPOINTMENT (OUTPATIENT)
Age: 60
End: 2025-05-07

## 2025-05-07 VITALS
RESPIRATION RATE: 18 BRPM | HEIGHT: 69 IN | OXYGEN SATURATION: 93 % | WEIGHT: 253 LBS | BODY MASS INDEX: 37.47 KG/M2 | HEART RATE: 79 BPM | SYSTOLIC BLOOD PRESSURE: 170 MMHG | DIASTOLIC BLOOD PRESSURE: 87 MMHG

## 2025-05-07 VITALS — SYSTOLIC BLOOD PRESSURE: 155 MMHG | DIASTOLIC BLOOD PRESSURE: 84 MMHG

## 2025-05-07 PROCEDURE — 93000 ELECTROCARDIOGRAM COMPLETE: CPT

## 2025-05-07 PROCEDURE — 99214 OFFICE O/P EST MOD 30 MIN: CPT | Mod: 25

## 2025-06-04 ENCOUNTER — APPOINTMENT (OUTPATIENT)
Dept: CARDIOLOGY | Facility: CLINIC | Age: 60
End: 2025-06-04

## 2025-06-16 ENCOUNTER — APPOINTMENT (OUTPATIENT)
Dept: RADIOLOGY | Facility: CLINIC | Age: 60
End: 2025-06-16
Payer: MEDICARE

## 2025-06-16 ENCOUNTER — APPOINTMENT (OUTPATIENT)
Dept: FAMILY MEDICINE | Facility: CLINIC | Age: 60
End: 2025-06-16
Payer: MEDICARE

## 2025-06-16 ENCOUNTER — OUTPATIENT (OUTPATIENT)
Dept: OUTPATIENT SERVICES | Facility: HOSPITAL | Age: 60
LOS: 1 days | End: 2025-06-16
Payer: MEDICARE

## 2025-06-16 VITALS
OXYGEN SATURATION: 96 % | DIASTOLIC BLOOD PRESSURE: 80 MMHG | HEART RATE: 83 BPM | WEIGHT: 250 LBS | SYSTOLIC BLOOD PRESSURE: 155 MMHG | TEMPERATURE: 97.7 F | BODY MASS INDEX: 37.03 KG/M2 | HEIGHT: 69 IN | RESPIRATION RATE: 16 BRPM

## 2025-06-16 DIAGNOSIS — R05.9 COUGH, UNSPECIFIED: ICD-10-CM

## 2025-06-16 DIAGNOSIS — M79.89 OTHER SPECIFIED SOFT TISSUE DISORDERS: ICD-10-CM

## 2025-06-16 PROCEDURE — 71046 X-RAY EXAM CHEST 2 VIEWS: CPT | Mod: 26

## 2025-06-16 PROCEDURE — 73562 X-RAY EXAM OF KNEE 3: CPT | Mod: 26,RT

## 2025-06-16 PROCEDURE — 71046 X-RAY EXAM CHEST 2 VIEWS: CPT

## 2025-06-16 PROCEDURE — 99214 OFFICE O/P EST MOD 30 MIN: CPT

## 2025-06-16 PROCEDURE — 73562 X-RAY EXAM OF KNEE 3: CPT

## 2025-06-16 RX ORDER — BENZONATATE 100 MG/1
100 CAPSULE ORAL TWICE DAILY
Qty: 14 | Refills: 0 | Status: ACTIVE | COMMUNITY
Start: 2025-06-16 | End: 1900-01-01

## 2025-06-17 ENCOUNTER — APPOINTMENT (OUTPATIENT)
Dept: PULMONOLOGY | Facility: CLINIC | Age: 60
End: 2025-06-17

## 2025-06-18 ENCOUNTER — APPOINTMENT (OUTPATIENT)
Dept: ORTHOPEDIC SURGERY | Facility: CLINIC | Age: 60
End: 2025-06-18

## 2025-06-18 VITALS — HEIGHT: 69 IN | BODY MASS INDEX: 37.03 KG/M2 | WEIGHT: 250 LBS

## 2025-06-18 PROCEDURE — 73560 X-RAY EXAM OF KNEE 1 OR 2: CPT | Mod: RT

## 2025-06-18 PROCEDURE — 99203 OFFICE O/P NEW LOW 30 MIN: CPT

## 2025-06-20 PROBLEM — Z86.79 HISTORY OF HYPERTENSION: Status: RESOLVED | Noted: 2025-06-20 | Resolved: 2025-06-20

## 2025-06-20 PROBLEM — Z86.39 HISTORY OF DIABETES MELLITUS: Status: RESOLVED | Noted: 2025-06-20 | Resolved: 2025-06-20

## 2025-06-20 PROBLEM — Z78.9 CONSUMES ALCOHOL OCCASIONALLY: Status: ACTIVE | Noted: 2025-06-20

## 2025-06-20 PROBLEM — Z82.61 FAMILY HISTORY OF ARTHRITIS: Status: ACTIVE | Noted: 2025-06-20

## 2025-06-20 PROBLEM — F17.200 CURRENT EVERY DAY SMOKER: Status: ACTIVE | Noted: 2025-06-20

## 2025-06-21 LAB
25(OH)D3 SERPL-MCNC: 17.3 NG/ML
ALBUMIN SERPL ELPH-MCNC: 4.2 G/DL
ALP BLD-CCNC: 75 U/L
ALT SERPL-CCNC: 47 U/L
ANION GAP SERPL CALC-SCNC: 13 MMOL/L
APPEARANCE: CLEAR
AST SERPL-CCNC: 32 U/L
BACTERIA: NEGATIVE /HPF
BASOPHILS # BLD AUTO: 0.02 K/UL
BASOPHILS NFR BLD AUTO: 0.3 %
BILIRUB SERPL-MCNC: 0.6 MG/DL
BILIRUBIN URINE: NEGATIVE
BLOOD URINE: NEGATIVE
BUN SERPL-MCNC: 17 MG/DL
CALCIUM SERPL-MCNC: 9.1 MG/DL
CAST: 0 /LPF
CHLORIDE SERPL-SCNC: 97 MMOL/L
CHOLEST SERPL-MCNC: 152 MG/DL
CO2 SERPL-SCNC: 28 MMOL/L
COLOR: YELLOW
CREAT SERPL-MCNC: 0.78 MG/DL
EGFRCR SERPLBLD CKD-EPI 2021: 101 ML/MIN/1.73M2
EOSINOPHIL # BLD AUTO: 0.11 K/UL
EOSINOPHIL NFR BLD AUTO: 1.6 %
EPITHELIAL CELLS: 1 /HPF
ESTIMATED AVERAGE GLUCOSE: 105 MG/DL
GLUCOSE QUALITATIVE U: NEGATIVE MG/DL
GLUCOSE SERPL-MCNC: 134 MG/DL
HBA1C MFR BLD HPLC: 5.3 %
HCT VFR BLD CALC: 41.9 %
HDLC SERPL-MCNC: 32 MG/DL
HGB BLD-MCNC: 14.4 G/DL
IMM GRANULOCYTES NFR BLD AUTO: 0.6 %
KETONES URINE: NEGATIVE MG/DL
LDLC SERPL-MCNC: 96 MG/DL
LEUKOCYTE ESTERASE URINE: ABNORMAL
LYMPHOCYTES # BLD AUTO: 1.06 K/UL
LYMPHOCYTES NFR BLD AUTO: 15.8 %
MAN DIFF?: NORMAL
MCHC RBC-ENTMCNC: 32.7 PG
MCHC RBC-ENTMCNC: 34.4 G/DL
MCV RBC AUTO: 95.2 FL
MICROSCOPIC-UA: NORMAL
MONOCYTES # BLD AUTO: 0.47 K/UL
MONOCYTES NFR BLD AUTO: 7 %
NEUTROPHILS # BLD AUTO: 5.03 K/UL
NEUTROPHILS NFR BLD AUTO: 74.7 %
NITRITE URINE: NEGATIVE
NONHDLC SERPL-MCNC: 120 MG/DL
PH URINE: 6
PLATELET # BLD AUTO: 197 K/UL
POTASSIUM SERPL-SCNC: 4.4 MMOL/L
PROT SERPL-MCNC: 6.9 G/DL
PROTEIN URINE: NEGATIVE MG/DL
RBC # BLD: 4.4 M/UL
RBC # FLD: 13.9 %
RED BLOOD CELLS URINE: 1 /HPF
SODIUM SERPL-SCNC: 139 MMOL/L
SPECIFIC GRAVITY URINE: 1.02
TRIGL SERPL-MCNC: 133 MG/DL
TSH SERPL-ACNC: 0.69 UIU/ML
UROBILINOGEN URINE: 1 MG/DL
WBC # FLD AUTO: 6.73 K/UL
WHITE BLOOD CELLS URINE: 1 /HPF

## 2025-06-24 ENCOUNTER — APPOINTMENT (OUTPATIENT)
Dept: ULTRASOUND IMAGING | Facility: CLINIC | Age: 60
End: 2025-06-24

## 2025-07-07 ENCOUNTER — APPOINTMENT (OUTPATIENT)
Dept: ORTHOPEDIC SURGERY | Facility: CLINIC | Age: 60
End: 2025-07-07
Payer: MEDICARE

## 2025-07-07 PROCEDURE — 20610 DRAIN/INJ JOINT/BURSA W/O US: CPT | Mod: RT

## 2025-07-07 PROCEDURE — 99213 OFFICE O/P EST LOW 20 MIN: CPT | Mod: 25

## 2025-07-09 VITALS — HEIGHT: 69 IN | WEIGHT: 250 LBS | BODY MASS INDEX: 37.03 KG/M2

## 2025-07-15 ENCOUNTER — OUTPATIENT (OUTPATIENT)
Dept: OUTPATIENT SERVICES | Facility: HOSPITAL | Age: 60
LOS: 1 days | End: 2025-07-15
Payer: MEDICARE

## 2025-07-15 ENCOUNTER — APPOINTMENT (OUTPATIENT)
Dept: SLEEP CENTER | Facility: CLINIC | Age: 60
End: 2025-07-15

## 2025-07-15 DIAGNOSIS — Z98.890 OTHER SPECIFIED POSTPROCEDURAL STATES: Chronic | ICD-10-CM

## 2025-07-15 PROCEDURE — 95811 POLYSOM 6/>YRS CPAP 4/> PARM: CPT

## 2025-07-15 PROCEDURE — 95811 POLYSOM 6/>YRS CPAP 4/> PARM: CPT | Mod: 26

## 2025-07-29 DIAGNOSIS — G47.33 OBSTRUCTIVE SLEEP APNEA (ADULT) (PEDIATRIC): ICD-10-CM

## 2025-07-30 ENCOUNTER — APPOINTMENT (OUTPATIENT)
Dept: VASCULAR SURGERY | Facility: CLINIC | Age: 60
End: 2025-07-30
Payer: MEDICARE

## 2025-07-30 VITALS
DIASTOLIC BLOOD PRESSURE: 72 MMHG | HEIGHT: 69 IN | OXYGEN SATURATION: 97 % | SYSTOLIC BLOOD PRESSURE: 138 MMHG | RESPIRATION RATE: 16 BRPM | WEIGHT: 241.5 LBS | TEMPERATURE: 98.4 F | HEART RATE: 86 BPM | BODY MASS INDEX: 35.77 KG/M2

## 2025-07-30 DIAGNOSIS — I87.2 VENOUS INSUFFICIENCY (CHRONIC) (PERIPHERAL): ICD-10-CM

## 2025-07-30 DIAGNOSIS — I83.813 VARICOSE VEINS OF BILATERAL LOWER EXTREMITIES WITH PAIN: ICD-10-CM

## 2025-07-30 PROCEDURE — 93970 EXTREMITY STUDY: CPT

## 2025-07-30 PROCEDURE — 99204 OFFICE O/P NEW MOD 45 MIN: CPT

## 2025-07-31 ENCOUNTER — APPOINTMENT (OUTPATIENT)
Dept: PULMONOLOGY | Facility: CLINIC | Age: 60
End: 2025-07-31
Payer: MEDICARE

## 2025-07-31 DIAGNOSIS — G47.33 OBSTRUCTIVE SLEEP APNEA (ADULT) (PEDIATRIC): ICD-10-CM

## 2025-07-31 DIAGNOSIS — G47.36 SLEEP RELATED HYPOVENTILATION IN CONDITIONS CLASSIFIED ELSEWHERE: ICD-10-CM

## 2025-07-31 PROCEDURE — G2211 COMPLEX E/M VISIT ADD ON: CPT

## 2025-07-31 PROCEDURE — 99214 OFFICE O/P EST MOD 30 MIN: CPT

## 2025-08-04 ENCOUNTER — APPOINTMENT (OUTPATIENT)
Dept: ORTHOPEDIC SURGERY | Facility: CLINIC | Age: 60
End: 2025-08-04
Payer: MEDICARE

## 2025-08-04 PROBLEM — G47.33 OBSTRUCTIVE SLEEP APNEA, ADULT: Status: ACTIVE | Noted: 2025-08-04

## 2025-08-04 PROBLEM — G47.36 SLEEP-RELATED HYPOVENTILATION DUE TO MEDICAL CONDITION: Status: ACTIVE | Noted: 2025-08-04

## 2025-08-04 PROCEDURE — 99213 OFFICE O/P EST LOW 20 MIN: CPT

## 2025-08-04 RX ORDER — SODIUM HYALURONATE INTRA-ARTICULAR SOLN PREF SYR 25 MG/2.5ML 25/2.5 MG/ML
25 SOLUTION PREFILLED SYRINGE INTRAARTICULAR
Qty: 5 | Refills: 0 | Status: ACTIVE | COMMUNITY
Start: 2025-08-04 | End: 1900-01-01

## 2025-08-05 VITALS — WEIGHT: 241 LBS | BODY MASS INDEX: 35.7 KG/M2 | HEIGHT: 69 IN

## 2025-08-05 DIAGNOSIS — Z82.49 FAMILY HISTORY OF ISCHEMIC HEART DISEASE AND OTHER DISEASES OF THE CIRCULATORY SYSTEM: ICD-10-CM

## 2025-08-05 DIAGNOSIS — I10 ESSENTIAL (PRIMARY) HYPERTENSION: ICD-10-CM

## 2025-08-05 DIAGNOSIS — M79.89 OTHER SPECIFIED SOFT TISSUE DISORDERS: ICD-10-CM

## 2025-08-06 RX ORDER — HYALURONATE SODIUM 20 MG/2 ML
20 SYRINGE (ML) INTRAARTICULAR
Qty: 3 | Refills: 0 | Status: ACTIVE | OUTPATIENT
Start: 2025-08-06

## 2025-08-14 ENCOUNTER — APPOINTMENT (OUTPATIENT)
Dept: ORTHOPEDIC SURGERY | Facility: CLINIC | Age: 60
End: 2025-08-14
Payer: MEDICARE

## 2025-08-14 PROBLEM — M17.11 PRIMARY OSTEOARTHRITIS OF RIGHT KNEE: Status: ACTIVE | Noted: 2025-06-19

## 2025-08-14 PROCEDURE — 20610 DRAIN/INJ JOINT/BURSA W/O US: CPT | Mod: RT

## 2025-08-21 ENCOUNTER — APPOINTMENT (OUTPATIENT)
Dept: ORTHOPEDIC SURGERY | Facility: CLINIC | Age: 60
End: 2025-08-21
Payer: MEDICARE

## 2025-08-21 PROCEDURE — 20610 DRAIN/INJ JOINT/BURSA W/O US: CPT | Mod: RT
